# Patient Record
Sex: FEMALE | Race: WHITE | Employment: FULL TIME | URBAN - METROPOLITAN AREA
[De-identification: names, ages, dates, MRNs, and addresses within clinical notes are randomized per-mention and may not be internally consistent; named-entity substitution may affect disease eponyms.]

---

## 2023-07-11 ENCOUNTER — HOSPITAL ENCOUNTER (INPATIENT)
Age: 58
LOS: 1 days | Discharge: HOME | DRG: 384 | End: 2023-07-12
Attending: EMERGENCY MEDICINE | Admitting: INTERNAL MEDICINE
Payer: COMMERCIAL

## 2023-07-11 ENCOUNTER — APPOINTMENT (OUTPATIENT)
Dept: CT IMAGING | Age: 58
DRG: 384 | End: 2023-07-11
Payer: COMMERCIAL

## 2023-07-11 DIAGNOSIS — R10.9 ABDOMINAL PAIN, UNSPECIFIED ABDOMINAL LOCATION: ICD-10-CM

## 2023-07-11 DIAGNOSIS — K27.9 PUD (PEPTIC ULCER DISEASE): Primary | ICD-10-CM

## 2023-07-11 DIAGNOSIS — K29.90 GASTRITIS AND DUODENITIS: ICD-10-CM

## 2023-07-11 PROBLEM — D72.829 LEUKOCYTOSIS: Status: ACTIVE | Noted: 2023-07-11

## 2023-07-11 PROBLEM — E80.6 HYPERBILIRUBINEMIA: Status: ACTIVE | Noted: 2023-07-11

## 2023-07-11 PROBLEM — D72.819 LEUKOCYTOPENIA: Status: ACTIVE | Noted: 2023-07-11

## 2023-07-11 PROBLEM — E53.8 B12 DEFICIENCY: Status: ACTIVE | Noted: 2023-07-11

## 2023-07-11 PROBLEM — K29.80 DUODENITIS: Status: ACTIVE | Noted: 2023-07-11

## 2023-07-11 PROBLEM — R03.0 ELEVATED BLOOD PRESSURE READING: Status: ACTIVE | Noted: 2023-07-11

## 2023-07-11 LAB
ALBUMIN SERPL-MCNC: 4.5 G/DL (ref 3.4–5)
ALP SERPL-CCNC: 113 U/L (ref 40–129)
ALT SERPL-CCNC: 12 U/L (ref 10–40)
AMYLASE SERPL-CCNC: 31 U/L (ref 25–115)
ANION GAP SERPL CALCULATED.3IONS-SCNC: 14 MMOL/L (ref 3–16)
AST SERPL-CCNC: 21 U/L (ref 15–37)
BACTERIA URNS QL MICRO: ABNORMAL /HPF
BACTERIA URNS QL MICRO: NORMAL /HPF
BASOPHILS # BLD: 0.1 K/UL (ref 0–0.2)
BASOPHILS NFR BLD: 0.7 %
BILIRUB DIRECT SERPL-MCNC: <0.2 MG/DL (ref 0–0.3)
BILIRUB INDIRECT SERPL-MCNC: ABNORMAL MG/DL (ref 0–1)
BILIRUB SERPL-MCNC: 1.1 MG/DL (ref 0–1)
BILIRUB UR QL STRIP.AUTO: NEGATIVE
BILIRUB UR QL STRIP.AUTO: NEGATIVE
BUN SERPL-MCNC: 24 MG/DL (ref 7–20)
CALCIUM SERPL-MCNC: 9.5 MG/DL (ref 8.3–10.6)
CHLORIDE SERPL-SCNC: 101 MMOL/L (ref 99–110)
CLARITY UR: CLEAR
CLARITY UR: CLEAR
CO2 SERPL-SCNC: 22 MMOL/L (ref 21–32)
COLOR UR: YELLOW
COLOR UR: YELLOW
CREAT SERPL-MCNC: 0.5 MG/DL (ref 0.6–1.1)
DEPRECATED RDW RBC AUTO: 13.3 % (ref 12.4–15.4)
EKG ATRIAL RATE: 82 BPM
EKG DIAGNOSIS: NORMAL
EKG P AXIS: 65 DEGREES
EKG P-R INTERVAL: 134 MS
EKG Q-T INTERVAL: 386 MS
EKG QRS DURATION: 88 MS
EKG QTC CALCULATION (BAZETT): 450 MS
EKG R AXIS: 71 DEGREES
EKG T AXIS: 57 DEGREES
EKG VENTRICULAR RATE: 82 BPM
EOSINOPHIL # BLD: 0 K/UL (ref 0–0.6)
EOSINOPHIL NFR BLD: 0.1 %
EPI CELLS #/AREA URNS AUTO: 1 /HPF (ref 0–5)
EPI CELLS #/AREA URNS AUTO: 3 /HPF (ref 0–5)
GFR SERPLBLD CREATININE-BSD FMLA CKD-EPI: >60 ML/MIN/{1.73_M2}
GLUCOSE SERPL-MCNC: 111 MG/DL (ref 70–99)
GLUCOSE UR STRIP.AUTO-MCNC: NEGATIVE MG/DL
GLUCOSE UR STRIP.AUTO-MCNC: NEGATIVE MG/DL
HCT VFR BLD AUTO: 39.4 % (ref 36–48)
HCT VFR BLD AUTO: 44 % (ref 36–48)
HGB BLD-MCNC: 12.9 G/DL (ref 12–16)
HGB BLD-MCNC: 14.6 G/DL (ref 12–16)
HGB UR QL STRIP.AUTO: ABNORMAL
HGB UR QL STRIP.AUTO: ABNORMAL
HYALINE CASTS #/AREA URNS AUTO: 0 /LPF (ref 0–8)
HYALINE CASTS #/AREA URNS AUTO: 1 /LPF (ref 0–8)
KETONES UR STRIP.AUTO-MCNC: 40 MG/DL
KETONES UR STRIP.AUTO-MCNC: 40 MG/DL
LACTATE BLDV-SCNC: 0.9 MMOL/L (ref 0.4–1.9)
LACTATE BLDV-SCNC: 1.1 MMOL/L (ref 0.4–1.9)
LEUKOCYTE ESTERASE UR QL STRIP.AUTO: NEGATIVE
LEUKOCYTE ESTERASE UR QL STRIP.AUTO: NEGATIVE
LIPASE SERPL-CCNC: 28 U/L (ref 13–60)
LYMPHOCYTES # BLD: 2.2 K/UL (ref 1–5.1)
LYMPHOCYTES NFR BLD: 15.9 %
MAGNESIUM SERPL-MCNC: 2.1 MG/DL (ref 1.8–2.4)
MCH RBC QN AUTO: 29.8 PG (ref 26–34)
MCHC RBC AUTO-ENTMCNC: 33.2 G/DL (ref 31–36)
MCV RBC AUTO: 89.7 FL (ref 80–100)
MONOCYTES # BLD: 0.8 K/UL (ref 0–1.3)
MONOCYTES NFR BLD: 5.9 %
NEUTROPHILS # BLD: 10.6 K/UL (ref 1.7–7.7)
NEUTROPHILS NFR BLD: 77.4 %
NITRITE UR QL STRIP.AUTO: NEGATIVE
NITRITE UR QL STRIP.AUTO: NEGATIVE
PH UR STRIP.AUTO: 5 [PH] (ref 5–8)
PH UR STRIP.AUTO: 5.5 [PH] (ref 5–8)
PLATELET # BLD AUTO: 314 K/UL (ref 135–450)
PMV BLD AUTO: 9.6 FL (ref 5–10.5)
POTASSIUM SERPL-SCNC: 4.6 MMOL/L (ref 3.5–5.1)
PROT SERPL-MCNC: 7.8 G/DL (ref 6.4–8.2)
PROT UR STRIP.AUTO-MCNC: ABNORMAL MG/DL
PROT UR STRIP.AUTO-MCNC: ABNORMAL MG/DL
RBC # BLD AUTO: 4.91 M/UL (ref 4–5.2)
RBC CLUMPS #/AREA URNS AUTO: 1 /HPF (ref 0–4)
RBC CLUMPS #/AREA URNS AUTO: 6 /HPF (ref 0–4)
SODIUM SERPL-SCNC: 137 MMOL/L (ref 136–145)
SP GR UR STRIP.AUTO: >=1.03 (ref 1–1.03)
SP GR UR STRIP.AUTO: >=1.03 (ref 1–1.03)
UA COMPLETE W REFLEX CULTURE PNL UR: ABNORMAL
UA DIPSTICK W REFLEX MICRO PNL UR: YES
UA DIPSTICK W REFLEX MICRO PNL UR: YES
URN SPEC COLLECT METH UR: ABNORMAL
URN SPEC COLLECT METH UR: ABNORMAL
UROBILINOGEN UR STRIP-ACNC: 0.2 E.U./DL
UROBILINOGEN UR STRIP-ACNC: 0.2 E.U./DL
WBC # BLD AUTO: 13.7 K/UL (ref 4–11)
WBC #/AREA URNS AUTO: 0 /HPF (ref 0–5)
WBC #/AREA URNS AUTO: 1 /HPF (ref 0–5)

## 2023-07-11 PROCEDURE — 83735 ASSAY OF MAGNESIUM: CPT

## 2023-07-11 PROCEDURE — 96376 TX/PRO/DX INJ SAME DRUG ADON: CPT

## 2023-07-11 PROCEDURE — 36415 COLL VENOUS BLD VENIPUNCTURE: CPT

## 2023-07-11 PROCEDURE — 83690 ASSAY OF LIPASE: CPT

## 2023-07-11 PROCEDURE — 93010 ELECTROCARDIOGRAM REPORT: CPT | Performed by: INTERNAL MEDICINE

## 2023-07-11 PROCEDURE — 85018 HEMOGLOBIN: CPT

## 2023-07-11 PROCEDURE — 74177 CT ABD & PELVIS W/CONTRAST: CPT

## 2023-07-11 PROCEDURE — 80048 BASIC METABOLIC PNL TOTAL CA: CPT

## 2023-07-11 PROCEDURE — 83605 ASSAY OF LACTIC ACID: CPT

## 2023-07-11 PROCEDURE — 80076 HEPATIC FUNCTION PANEL: CPT

## 2023-07-11 PROCEDURE — 96375 TX/PRO/DX INJ NEW DRUG ADDON: CPT

## 2023-07-11 PROCEDURE — 96374 THER/PROPH/DIAG INJ IV PUSH: CPT

## 2023-07-11 PROCEDURE — 81001 URINALYSIS AUTO W/SCOPE: CPT

## 2023-07-11 PROCEDURE — 99285 EMERGENCY DEPT VISIT HI MDM: CPT

## 2023-07-11 PROCEDURE — 1200000000 HC SEMI PRIVATE

## 2023-07-11 PROCEDURE — 6360000002 HC RX W HCPCS: Performed by: PHYSICIAN ASSISTANT

## 2023-07-11 PROCEDURE — 2580000003 HC RX 258: Performed by: PHYSICIAN ASSISTANT

## 2023-07-11 PROCEDURE — 93005 ELECTROCARDIOGRAM TRACING: CPT | Performed by: PHYSICIAN ASSISTANT

## 2023-07-11 PROCEDURE — G0378 HOSPITAL OBSERVATION PER HR: HCPCS

## 2023-07-11 PROCEDURE — 82150 ASSAY OF AMYLASE: CPT

## 2023-07-11 PROCEDURE — 96366 THER/PROPH/DIAG IV INF ADDON: CPT

## 2023-07-11 PROCEDURE — 85025 COMPLETE CBC W/AUTO DIFF WBC: CPT

## 2023-07-11 PROCEDURE — 6360000004 HC RX CONTRAST MEDICATION: Performed by: PHYSICIAN ASSISTANT

## 2023-07-11 PROCEDURE — 96365 THER/PROPH/DIAG IV INF INIT: CPT

## 2023-07-11 PROCEDURE — 2580000003 HC RX 258: Performed by: INTERNAL MEDICINE

## 2023-07-11 PROCEDURE — 6360000002 HC RX W HCPCS: Performed by: INTERNAL MEDICINE

## 2023-07-11 PROCEDURE — C9113 INJ PANTOPRAZOLE SODIUM, VIA: HCPCS | Performed by: PHYSICIAN ASSISTANT

## 2023-07-11 PROCEDURE — 85014 HEMATOCRIT: CPT

## 2023-07-11 RX ORDER — METRONIDAZOLE 500 MG/100ML
500 INJECTION, SOLUTION INTRAVENOUS EVERY 8 HOURS
Status: DISCONTINUED | OUTPATIENT
Start: 2023-07-11 | End: 2023-07-12 | Stop reason: HOSPADM

## 2023-07-11 RX ORDER — ACETAMINOPHEN 650 MG/1
650 SUPPOSITORY RECTAL EVERY 6 HOURS PRN
Status: DISCONTINUED | OUTPATIENT
Start: 2023-07-11 | End: 2023-07-12 | Stop reason: HOSPADM

## 2023-07-11 RX ORDER — LANOLIN ALCOHOL/MO/W.PET/CERES
1000 CREAM (GRAM) TOPICAL DAILY
COMMUNITY

## 2023-07-11 RX ORDER — LANOLIN ALCOHOL/MO/W.PET/CERES
1000 CREAM (GRAM) TOPICAL DAILY
Status: DISCONTINUED | OUTPATIENT
Start: 2023-07-12 | End: 2023-07-12 | Stop reason: HOSPADM

## 2023-07-11 RX ORDER — SODIUM CHLORIDE 9 MG/ML
INJECTION, SOLUTION INTRAVENOUS PRN
Status: DISCONTINUED | OUTPATIENT
Start: 2023-07-11 | End: 2023-07-12 | Stop reason: HOSPADM

## 2023-07-11 RX ORDER — ESTRADIOL 0.07 MG/D
1 FILM, EXTENDED RELEASE TRANSDERMAL
Status: DISCONTINUED | OUTPATIENT
Start: 2023-07-14 | End: 2023-07-12 | Stop reason: HOSPADM

## 2023-07-11 RX ORDER — SODIUM CHLORIDE 0.9 % (FLUSH) 0.9 %
5-40 SYRINGE (ML) INJECTION EVERY 12 HOURS SCHEDULED
Status: DISCONTINUED | OUTPATIENT
Start: 2023-07-11 | End: 2023-07-12 | Stop reason: HOSPADM

## 2023-07-11 RX ORDER — 0.9 % SODIUM CHLORIDE 0.9 %
500 INTRAVENOUS SOLUTION INTRAVENOUS PRN
Status: DISCONTINUED | OUTPATIENT
Start: 2023-07-11 | End: 2023-07-12 | Stop reason: HOSPADM

## 2023-07-11 RX ORDER — HYDRALAZINE HYDROCHLORIDE 20 MG/ML
10 INJECTION INTRAMUSCULAR; INTRAVENOUS EVERY 6 HOURS PRN
Status: DISCONTINUED | OUTPATIENT
Start: 2023-07-11 | End: 2023-07-12 | Stop reason: HOSPADM

## 2023-07-11 RX ORDER — POTASSIUM CHLORIDE 20 MEQ/1
40 TABLET, EXTENDED RELEASE ORAL PRN
Status: DISCONTINUED | OUTPATIENT
Start: 2023-07-11 | End: 2023-07-12 | Stop reason: HOSPADM

## 2023-07-11 RX ORDER — POTASSIUM CHLORIDE 7.45 MG/ML
10 INJECTION INTRAVENOUS PRN
Status: DISCONTINUED | OUTPATIENT
Start: 2023-07-11 | End: 2023-07-12 | Stop reason: HOSPADM

## 2023-07-11 RX ORDER — 0.9 % SODIUM CHLORIDE 0.9 %
1000 INTRAVENOUS SOLUTION INTRAVENOUS ONCE
Status: COMPLETED | OUTPATIENT
Start: 2023-07-11 | End: 2023-07-11

## 2023-07-11 RX ORDER — LEVOFLOXACIN 5 MG/ML
750 INJECTION, SOLUTION INTRAVENOUS EVERY 24 HOURS
Status: DISCONTINUED | OUTPATIENT
Start: 2023-07-11 | End: 2023-07-12 | Stop reason: HOSPADM

## 2023-07-11 RX ORDER — SODIUM CHLORIDE 0.9 % (FLUSH) 0.9 %
5-40 SYRINGE (ML) INJECTION PRN
Status: DISCONTINUED | OUTPATIENT
Start: 2023-07-11 | End: 2023-07-12 | Stop reason: HOSPADM

## 2023-07-11 RX ORDER — CIPROFLOXACIN 2 MG/ML
400 INJECTION, SOLUTION INTRAVENOUS EVERY 12 HOURS
Status: DISCONTINUED | OUTPATIENT
Start: 2023-07-11 | End: 2023-07-11 | Stop reason: RX

## 2023-07-11 RX ORDER — HYDROMORPHONE HYDROCHLORIDE 1 MG/ML
1 INJECTION, SOLUTION INTRAMUSCULAR; INTRAVENOUS; SUBCUTANEOUS EVERY 6 HOURS PRN
Status: DISCONTINUED | OUTPATIENT
Start: 2023-07-11 | End: 2023-07-12 | Stop reason: HOSPADM

## 2023-07-11 RX ORDER — ONDANSETRON 2 MG/ML
4 INJECTION INTRAMUSCULAR; INTRAVENOUS EVERY 30 MIN PRN
Status: DISCONTINUED | OUTPATIENT
Start: 2023-07-11 | End: 2023-07-11

## 2023-07-11 RX ORDER — ONDANSETRON 2 MG/ML
4 INJECTION INTRAMUSCULAR; INTRAVENOUS EVERY 6 HOURS PRN
Status: DISCONTINUED | OUTPATIENT
Start: 2023-07-11 | End: 2023-07-12 | Stop reason: HOSPADM

## 2023-07-11 RX ORDER — ONDANSETRON 4 MG/1
4 TABLET, ORALLY DISINTEGRATING ORAL EVERY 8 HOURS PRN
Status: DISCONTINUED | OUTPATIENT
Start: 2023-07-11 | End: 2023-07-12 | Stop reason: HOSPADM

## 2023-07-11 RX ORDER — HYDROMORPHONE HYDROCHLORIDE 1 MG/ML
1 INJECTION, SOLUTION INTRAMUSCULAR; INTRAVENOUS; SUBCUTANEOUS ONCE
Status: COMPLETED | OUTPATIENT
Start: 2023-07-11 | End: 2023-07-11

## 2023-07-11 RX ORDER — ACETAMINOPHEN 325 MG/1
650 TABLET ORAL EVERY 6 HOURS PRN
Status: DISCONTINUED | OUTPATIENT
Start: 2023-07-11 | End: 2023-07-12 | Stop reason: HOSPADM

## 2023-07-11 RX ORDER — METHYLCELLULOSE 500 MG/1
1 TABLET ORAL DAILY
COMMUNITY

## 2023-07-11 RX ORDER — SODIUM CHLORIDE 9 MG/ML
INJECTION, SOLUTION INTRAVENOUS CONTINUOUS
Status: DISCONTINUED | OUTPATIENT
Start: 2023-07-11 | End: 2023-07-12 | Stop reason: HOSPADM

## 2023-07-11 RX ADMIN — HYDROMORPHONE HYDROCHLORIDE 1 MG: 1 INJECTION, SOLUTION INTRAMUSCULAR; INTRAVENOUS; SUBCUTANEOUS at 17:30

## 2023-07-11 RX ADMIN — SODIUM CHLORIDE 1000 ML: 9 INJECTION, SOLUTION INTRAVENOUS at 12:39

## 2023-07-11 RX ADMIN — SODIUM CHLORIDE 80 MG: 9 INJECTION, SOLUTION INTRAVENOUS at 15:39

## 2023-07-11 RX ADMIN — SODIUM CHLORIDE: 9 INJECTION, SOLUTION INTRAVENOUS at 17:42

## 2023-07-11 RX ADMIN — HYDROMORPHONE HYDROCHLORIDE 1 MG: 1 INJECTION, SOLUTION INTRAMUSCULAR; INTRAVENOUS; SUBCUTANEOUS at 12:40

## 2023-07-11 RX ADMIN — SODIUM CHLORIDE 8 MG/HR: 9 INJECTION, SOLUTION INTRAVENOUS at 16:20

## 2023-07-11 RX ADMIN — METRONIDAZOLE 500 MG: 500 INJECTION, SOLUTION INTRAVENOUS at 17:43

## 2023-07-11 RX ADMIN — IOPAMIDOL 75 ML: 755 INJECTION, SOLUTION INTRAVENOUS at 14:12

## 2023-07-11 RX ADMIN — ONDANSETRON 4 MG: 2 INJECTION INTRAMUSCULAR; INTRAVENOUS at 12:40

## 2023-07-11 RX ADMIN — LEVOFLOXACIN 750 MG: 5 INJECTION, SOLUTION INTRAVENOUS at 19:28

## 2023-07-11 ASSESSMENT — PAIN - FUNCTIONAL ASSESSMENT: PAIN_FUNCTIONAL_ASSESSMENT: 0-10

## 2023-07-11 ASSESSMENT — PAIN DESCRIPTION - ORIENTATION
ORIENTATION: RIGHT;UPPER
ORIENTATION: UPPER
ORIENTATION: RIGHT;UPPER
ORIENTATION: RIGHT

## 2023-07-11 ASSESSMENT — PAIN SCALES - GENERAL
PAINLEVEL_OUTOF10: 5
PAINLEVEL_OUTOF10: 7
PAINLEVEL_OUTOF10: 8
PAINLEVEL_OUTOF10: 8
PAINLEVEL_OUTOF10: 0

## 2023-07-11 ASSESSMENT — ENCOUNTER SYMPTOMS
SHORTNESS OF BREATH: 0
COUGH: 0
DIARRHEA: 1
NAUSEA: 1
ABDOMINAL PAIN: 1
CHEST TIGHTNESS: 0
VOMITING: 0

## 2023-07-11 ASSESSMENT — PAIN DESCRIPTION - LOCATION
LOCATION: ABDOMEN

## 2023-07-11 ASSESSMENT — LIFESTYLE VARIABLES: HOW OFTEN DO YOU HAVE A DRINK CONTAINING ALCOHOL: NEVER

## 2023-07-11 ASSESSMENT — PAIN DESCRIPTION - DESCRIPTORS
DESCRIPTORS: ACHING
DESCRIPTORS: SHARP;STABBING

## 2023-07-11 NOTE — PROGRESS NOTES
Patient admitted to the unit from ED. She is A&Ox4, VSS and on room air. Call light in reach, will continue to monitor.

## 2023-07-11 NOTE — PROGRESS NOTES
Clinical Pharmacy Note    Pharmacy can not supply estradiol patch. I have notified the nurse and requested the medication be brought from home. The patient/nurse is unsure if the medication will be able to be brought in. Please follow to ensure that medication therapy needs are met.     Thanks,  Roberto Snyder, PharmD

## 2023-07-11 NOTE — ED PROVIDER NOTES
HENT:      Head: Normocephalic and atraumatic. Mouth/Throat:      Mouth: Mucous membranes are moist.   Eyes:      General:         Right eye: No discharge. Left eye: No discharge. Pupils: Pupils are equal, round, and reactive to light. Cardiovascular:      Rate and Rhythm: Normal rate and regular rhythm. Pulses: Normal pulses. Heart sounds: Normal heart sounds. Pulmonary:      Effort: Pulmonary effort is normal. No respiratory distress. Breath sounds: Normal breath sounds. Abdominal:      General: Abdomen is flat. Bowel sounds are normal. There is no distension. Palpations: Abdomen is soft. Tenderness: There is abdominal tenderness in the right upper quadrant. There is no guarding. Positive signs include Aragon's sign. Comments: Patient has moderate tenderness on palpation of the right upper abdomen with a positive Aragon sign. Her abdomen is soft with bowel sounds present in all 4 quadrants. There is no distention, rigidity or guarding. Musculoskeletal:         General: Normal range of motion. Cervical back: Normal range of motion and neck supple. Skin:     General: Skin is warm and dry. Capillary Refill: Capillary refill takes less than 2 seconds. Coloration: Skin is not pale. Neurological:      Mental Status: She is alert and oriented to person, place, and time. Psychiatric:         Behavior: Behavior normal.           DIAGNOSTIC RESULTS   LABS:    Labs Reviewed   CBC WITH AUTO DIFFERENTIAL - Abnormal; Notable for the following components:       Result Value    WBC 13.7 (*)     Neutrophils Absolute 10.6 (*)     All other components within normal limits   BASIC METABOLIC PANEL - Abnormal; Notable for the following components:    Glucose 111 (*)     BUN 24 (*)     Creatinine 0.5 (*)     All other components within normal limits   HEPATIC FUNCTION PANEL - Abnormal; Notable for the following components:     Total Bilirubin 1.1 (*)     All mucosal enhancement with mild adjacent inflammatory changes involving the duodenal bulb and proximal aspect of the second portion of the duodenum concerning for peptic ulcer disease. There is outpouching medially at the proximal second portion of the duodenum which could represent a small ulceration. Patient was very uncomfortable at the time of my initial examination. She is given fluids as well as Dilaudid and Zofran. I did consult gastroenterology who recommends admission with EGD in the morning. Patient is started on a Protonix bolus and infusion. Disposition Considerations (include 1 Tests not done, Shared Decision Making, Pt Expectation of Test or Tx.): I had a lengthy discussion with the patient regarding testing completed in the emergency department today as well as the results. She understands she will require admission for further evaluation and treatment. She is agreeable with this. She is now resting comfortably and in no obvious distress. She states she is feeling much better. She continues denying any chest pain or shortness of breath. I believe her right shoulder pain could be a [employer]. Dr. Dionte Moulton was consulted who is agreeable with admission. Admission      I am the Primary Clinician of Record. FINAL IMPRESSION      1. PUD (peptic ulcer disease)    2. Gastritis and duodenitis          DISPOSITION/PLAN     DISPOSITION Decision To Admit 07/11/2023 02:55:32 PM      PATIENT REFERRED TO:  No follow-up provider specified.     DISCHARGE MEDICATIONS:  New Prescriptions    No medications on file       DISCONTINUED MEDICATIONS:  Discontinued Medications    No medications on file              (Please note that portions of this note were completed with a voice recognition program.  Efforts were made to edit the dictations but occasionally words are mis-transcribed.)    Shama Cintron PA-C (electronically signed)           Shama Cintron PA-C  07/11/23 3761

## 2023-07-11 NOTE — PROGRESS NOTES
Pt admitted for peptic ulcer disease    Full h+p to follow    Active Hospital Problems    Diagnosis Date Noted    Peptic ulcer disease [K27.9] 07/11/2023    Duodenitis [K29.80] 07/11/2023    B12 deficiency [E53.8] 07/11/2023    Hyperbilirubinemia [E80.6] 07/11/2023    Elevated blood pressure reading [R03.0] 07/11/2023    Leukocytopenia [D72.819] 07/11/2023    Leukocytosis [D72.829] 07/11/2023       Please use Synference to contact me with any questions during the day. The hospitalist service will provide cross-coverage for this patient from 7pm to 7am.    During those hours, contact the on-call hospitalist MD/AYDE for questions.

## 2023-07-11 NOTE — PROGRESS NOTES
Pharmacy Home Medication Reconciliation Note    A medication reconciliation has been completed for Sarah Barnett 1965    Pharmacy: [id number] [address]  Information provided by: patient    The patient's home medication list is as follows: No current facility-administered medications on file prior to encounter. Current Outpatient Medications on File Prior to Encounter   Medication Sig Dispense Refill    vitamin B-12 (CYANOCOBALAMIN) 1000 MCG tablet Take 1 tablet by mouth daily      methylcellulose (CITRUCEL) 500 MG TABS Take 1 tablet by mouth daily      estradiol (VIVELLE) 0.075 MG/24HR APPLY 1 PATCH TRANSDERMALLY TWICE WEEKLY 3 patch 2       Of note, patient placed estradiol patch this morning: replaces twice weekly (usually Monday and Thursday). Timing of last doses updated. Thank you,  Elayne Erazo

## 2023-07-11 NOTE — PROGRESS NOTES
Clinical Pharmacy Note    Pharmacy can not supply methylcellulose. I have notified the nurse and requested the medication be brought from home. The patient/nurse is unsure if the medication will be able to be brought in. Please follow to ensure that medication therapy needs are met.     Thanks,  Albaro Lema, PharmD

## 2023-07-11 NOTE — ED NOTES
ED TO INPATIENT SBAR HANDOFF    Patient Name: Rae Adams   :  1965  59 y.o. MRN:  [id number]  Preferred Name  [address]  ED Room #:  ED-0028/28  Family/Caregiver Present yes   Restraints no   Sitter no   Sepsis Risk Score Sepsis Risk Score: 1.15    Situation  Code Status: No Order No additional code details. Allergies: Codeine, Hydrocodone-acetaminophen, Oxycodone-acetaminophen, and Propoxyphene  Weight: Patient Vitals for the past 96 hrs (Last 3 readings):   Weight   23 1147 226 lb 8 oz (102.7 kg)     Arrived from: home  Chief Complaint:   Chief Complaint   Patient presents with    Abdominal Pain     Lower right abdominal, upper right shoulder pain, nausea, fever on Saturday 100.7, chills     Hospital Problem/Diagnosis:  Principal Problem:    Peptic ulcer disease  Active Problems:    Duodenitis    B12 deficiency    Hyperbilirubinemia    Elevated blood pressure reading    Leukocytosis  Resolved Problems:    * No resolved hospital problems. *    Imaging:   CT ABDOMEN PELVIS W IV CONTRAST Additional Contrast? None   Final Result   1. Thickening, increased mucosal enhancement mild adjacent inflammatory   changes involving the duodenal bulb and proximal aspect of the 2nd portion of   the duodenum which could represent duodenitis or peptic ulcer disease. Peptic ulcer disease is favored. There is a small outpouching medially at   the proximal 2nd portion the duodenum which could represent a small   ulceration. 2. No acute findings elsewhere in the abdomen or pelvis.            Abnormal labs:   Abnormal Labs Reviewed   CBC WITH AUTO DIFFERENTIAL - Abnormal; Notable for the following components:       Result Value    WBC 13.7 (*)     Neutrophils Absolute 10.6 (*)     All other components within normal limits   BASIC METABOLIC PANEL - Abnormal; Notable for the following components:    Glucose 111 (*)     BUN 24 (*)     Creatinine 0.5 (*)     All other components within normal limits   HEPATIC FUNCTION PANEL - Abnormal; Notable for the following components: Total Bilirubin 1.1 (*)     All other components within normal limits   URINALYSIS WITH REFLEX TO CULTURE - Abnormal; Notable for the following components:    Ketones, Urine 40 (*)     Blood, Urine SMALL (*)     Protein, UA TRACE (*)     All other components within normal limits   MICROSCOPIC URINALYSIS - Abnormal; Notable for the following components:    RBC, UA 6 (*)     All other components within normal limits     Critical values: no     Abnormal Assessment Findings:     Background  History:   Past Medical History:   Diagnosis Date    Herniated cervical disc        Assessment    Vitals/MEWS: MEWS Score: 1  Level of Consciousness: Alert (0)   Vitals:    07/11/23 1225 07/11/23 1230 07/11/23 1235 07/11/23 1240   BP:       Pulse: 93 81 84 83   Resp: 12 18 13 15   Temp:       TempSrc:       SpO2: 96% 98% 97% 98%   Weight:       Height:         FiO2 (%): None  O2 Flow Rate: O2 Device: None (Room air)    Cardiac Rhythm:    Pain Assessment: 7/10 [] Verbal [] Synetta Carrier Scale  Pain Scale: Pain Assessment  Pain Assessment: 0-10  Pain Level: 8  Patient's Stated Pain Goal: 0 - No pain  Pain Location: Abdomen  Pain Orientation: Upper  Last documented pain score (0-10 scale) Pain Level: 8  Last documented pain medication administered: 1400  Mental Status: oriented  Orientation Level:    NIH Score:    C-SSRS: Risk of Suicide: No Risk  Bedside swallow:    [place] Coma Scale (GCS): Naila Coma Scale  Eye Opening: Spontaneous  Best Verbal Response: Oriented  Best Motor Response: Obeys commands  [place] Coma Scale Score: 15  Active LDA's:   Peripheral IV 07/11/23 Left Antecubital (Active)   Site Assessment Clean, dry & intact 07/11/23 1234   Line Status Blood return noted; Flushed 07/11/23 1234   Phlebitis Assessment No symptoms 07/11/23 1234   Infiltration Assessment 0 07/11/23 1234     PO Status: Nothing by Mouth at midnight  Pertinent or High Risk

## 2023-07-12 ENCOUNTER — ANESTHESIA EVENT (OUTPATIENT)
Dept: ENDOSCOPY | Age: 58
DRG: 384 | End: 2023-07-12
Payer: COMMERCIAL

## 2023-07-12 ENCOUNTER — ANESTHESIA (OUTPATIENT)
Dept: ENDOSCOPY | Age: 58
DRG: 384 | End: 2023-07-12
Payer: COMMERCIAL

## 2023-07-12 VITALS
SYSTOLIC BLOOD PRESSURE: 158 MMHG | HEIGHT: 67 IN | TEMPERATURE: 98 F | RESPIRATION RATE: 20 BRPM | WEIGHT: 226 LBS | DIASTOLIC BLOOD PRESSURE: 82 MMHG | BODY MASS INDEX: 35.47 KG/M2 | HEART RATE: 70 BPM | OXYGEN SATURATION: 98 %

## 2023-07-12 LAB
ANION GAP SERPL CALCULATED.3IONS-SCNC: 10 MMOL/L (ref 3–16)
APTT BLD: 27.4 SEC (ref 22.7–35.9)
BASOPHILS # BLD: 0.1 K/UL (ref 0–0.2)
BASOPHILS NFR BLD: 0.6 %
BUN SERPL-MCNC: 13 MG/DL (ref 7–20)
CALCIUM SERPL-MCNC: 8.9 MG/DL (ref 8.3–10.6)
CHLORIDE SERPL-SCNC: 105 MMOL/L (ref 99–110)
CO2 SERPL-SCNC: 23 MMOL/L (ref 21–32)
CREAT SERPL-MCNC: 0.5 MG/DL (ref 0.6–1.1)
DEPRECATED RDW RBC AUTO: 13.2 % (ref 12.4–15.4)
EOSINOPHIL # BLD: 0 K/UL (ref 0–0.6)
EOSINOPHIL NFR BLD: 0.5 %
GFR SERPLBLD CREATININE-BSD FMLA CKD-EPI: >60 ML/MIN/{1.73_M2}
GLUCOSE SERPL-MCNC: 103 MG/DL (ref 70–99)
HCT VFR BLD AUTO: 38.4 % (ref 36–48)
HCT VFR BLD AUTO: 41.2 % (ref 36–48)
HCT VFR BLD AUTO: 41.4 % (ref 36–48)
HGB BLD-MCNC: 12.9 G/DL (ref 12–16)
HGB BLD-MCNC: 13.6 G/DL (ref 12–16)
HGB BLD-MCNC: 13.7 G/DL (ref 12–16)
INR PPP: 1.16 (ref 0.84–1.16)
IRON SATN MFR SERPL: 43 % (ref 15–50)
IRON SERPL-MCNC: 143 UG/DL (ref 37–145)
LACTATE BLDV-SCNC: 1.1 MMOL/L (ref 0.4–2)
LYMPHOCYTES # BLD: 2 K/UL (ref 1–5.1)
LYMPHOCYTES NFR BLD: 22.5 %
MAGNESIUM SERPL-MCNC: 2 MG/DL (ref 1.8–2.4)
MCH RBC QN AUTO: 30 PG (ref 26–34)
MCHC RBC AUTO-ENTMCNC: 33.2 G/DL (ref 31–36)
MCV RBC AUTO: 90.2 FL (ref 80–100)
MONOCYTES # BLD: 0.7 K/UL (ref 0–1.3)
MONOCYTES NFR BLD: 7.7 %
NEUTROPHILS # BLD: 6.2 K/UL (ref 1.7–7.7)
NEUTROPHILS NFR BLD: 68.7 %
PLATELET # BLD AUTO: 262 K/UL (ref 135–450)
PMV BLD AUTO: 9.2 FL (ref 5–10.5)
POTASSIUM SERPL-SCNC: 3.9 MMOL/L (ref 3.5–5.1)
PROCALCITONIN SERPL IA-MCNC: 0.05 NG/ML (ref 0–0.15)
PROTHROMBIN TIME: 14.8 SEC (ref 11.5–14.8)
RBC # BLD AUTO: 4.57 M/UL (ref 4–5.2)
SODIUM SERPL-SCNC: 138 MMOL/L (ref 136–145)
TIBC SERPL-MCNC: 334 UG/DL (ref 260–445)
WBC # BLD AUTO: 9.1 K/UL (ref 4–11)

## 2023-07-12 PROCEDURE — 84145 PROCALCITONIN (PCT): CPT

## 2023-07-12 PROCEDURE — 2580000003 HC RX 258: Performed by: INTERNAL MEDICINE

## 2023-07-12 PROCEDURE — 2580000003 HC RX 258: Performed by: NURSE ANESTHETIST, CERTIFIED REGISTERED

## 2023-07-12 PROCEDURE — 96366 THER/PROPH/DIAG IV INF ADDON: CPT

## 2023-07-12 PROCEDURE — 3609012400 HC EGD TRANSORAL BIOPSY SINGLE/MULTIPLE: Performed by: INTERNAL MEDICINE

## 2023-07-12 PROCEDURE — 80048 BASIC METABOLIC PNL TOTAL CA: CPT

## 2023-07-12 PROCEDURE — 85014 HEMATOCRIT: CPT

## 2023-07-12 PROCEDURE — G0378 HOSPITAL OBSERVATION PER HR: HCPCS

## 2023-07-12 PROCEDURE — 88305 TISSUE EXAM BY PATHOLOGIST: CPT

## 2023-07-12 PROCEDURE — 7100000001 HC PACU RECOVERY - ADDTL 15 MIN: Performed by: INTERNAL MEDICINE

## 2023-07-12 PROCEDURE — 83540 ASSAY OF IRON: CPT

## 2023-07-12 PROCEDURE — 85610 PROTHROMBIN TIME: CPT

## 2023-07-12 PROCEDURE — 85025 COMPLETE CBC W/AUTO DIFF WBC: CPT

## 2023-07-12 PROCEDURE — 3700000001 HC ADD 15 MINUTES (ANESTHESIA): Performed by: INTERNAL MEDICINE

## 2023-07-12 PROCEDURE — 85730 THROMBOPLASTIN TIME PARTIAL: CPT

## 2023-07-12 PROCEDURE — 7100000000 HC PACU RECOVERY - FIRST 15 MIN: Performed by: INTERNAL MEDICINE

## 2023-07-12 PROCEDURE — 36415 COLL VENOUS BLD VENIPUNCTURE: CPT

## 2023-07-12 PROCEDURE — 6370000000 HC RX 637 (ALT 250 FOR IP): Performed by: INTERNAL MEDICINE

## 2023-07-12 PROCEDURE — 83735 ASSAY OF MAGNESIUM: CPT

## 2023-07-12 PROCEDURE — 88342 IMHCHEM/IMCYTCHM 1ST ANTB: CPT

## 2023-07-12 PROCEDURE — 83550 IRON BINDING TEST: CPT

## 2023-07-12 PROCEDURE — 2500000003 HC RX 250 WO HCPCS: Performed by: NURSE ANESTHETIST, CERTIFIED REGISTERED

## 2023-07-12 PROCEDURE — 83605 ASSAY OF LACTIC ACID: CPT

## 2023-07-12 PROCEDURE — 96376 TX/PRO/DX INJ SAME DRUG ADON: CPT

## 2023-07-12 PROCEDURE — 3700000000 HC ANESTHESIA ATTENDED CARE: Performed by: INTERNAL MEDICINE

## 2023-07-12 PROCEDURE — 6360000002 HC RX W HCPCS: Performed by: INTERNAL MEDICINE

## 2023-07-12 PROCEDURE — 6360000002 HC RX W HCPCS: Performed by: NURSE ANESTHETIST, CERTIFIED REGISTERED

## 2023-07-12 PROCEDURE — 2709999900 HC NON-CHARGEABLE SUPPLY: Performed by: INTERNAL MEDICINE

## 2023-07-12 PROCEDURE — 0DB68ZX EXCISION OF STOMACH, VIA NATURAL OR ARTIFICIAL OPENING ENDOSCOPIC, DIAGNOSTIC: ICD-10-PCS | Performed by: INTERNAL MEDICINE

## 2023-07-12 PROCEDURE — 85018 HEMOGLOBIN: CPT

## 2023-07-12 RX ORDER — PROPOFOL 10 MG/ML
INJECTION, EMULSION INTRAVENOUS PRN
Status: DISCONTINUED | OUTPATIENT
Start: 2023-07-12 | End: 2023-07-12 | Stop reason: SDUPTHER

## 2023-07-12 RX ORDER — PANTOPRAZOLE SODIUM 40 MG/1
40 TABLET, DELAYED RELEASE ORAL
Qty: 30 TABLET | Refills: 3 | Status: SHIPPED | OUTPATIENT
Start: 2023-07-12

## 2023-07-12 RX ORDER — LIDOCAINE HYDROCHLORIDE 20 MG/ML
INJECTION, SOLUTION EPIDURAL; INFILTRATION; INTRACAUDAL; PERINEURAL PRN
Status: DISCONTINUED | OUTPATIENT
Start: 2023-07-12 | End: 2023-07-12 | Stop reason: SDUPTHER

## 2023-07-12 RX ORDER — SODIUM CHLORIDE 9 MG/ML
INJECTION, SOLUTION INTRAVENOUS CONTINUOUS
Status: DISCONTINUED | OUTPATIENT
Start: 2023-07-12 | End: 2023-07-12 | Stop reason: HOSPADM

## 2023-07-12 RX ORDER — PANTOPRAZOLE SODIUM 40 MG/1
40 TABLET, DELAYED RELEASE ORAL
Status: DISCONTINUED | OUTPATIENT
Start: 2023-07-12 | End: 2023-07-12 | Stop reason: HOSPADM

## 2023-07-12 RX ORDER — SODIUM CHLORIDE 9 MG/ML
INJECTION, SOLUTION INTRAVENOUS CONTINUOUS PRN
Status: DISCONTINUED | OUTPATIENT
Start: 2023-07-12 | End: 2023-07-12 | Stop reason: SDUPTHER

## 2023-07-12 RX ADMIN — PROPOFOL 50 MG: 10 INJECTION, EMULSION INTRAVENOUS at 10:45

## 2023-07-12 RX ADMIN — SODIUM CHLORIDE: 9 INJECTION, SOLUTION INTRAVENOUS at 10:08

## 2023-07-12 RX ADMIN — HYDROMORPHONE HYDROCHLORIDE 1 MG: 1 INJECTION, SOLUTION INTRAMUSCULAR; INTRAVENOUS; SUBCUTANEOUS at 00:09

## 2023-07-12 RX ADMIN — ACETAMINOPHEN 650 MG: 325 TABLET ORAL at 05:31

## 2023-07-12 RX ADMIN — PROPOFOL 100 MCG/KG/MIN: 10 INJECTION, EMULSION INTRAVENOUS at 11:23

## 2023-07-12 RX ADMIN — SODIUM CHLORIDE: 9 INJECTION, SOLUTION INTRAVENOUS at 11:17

## 2023-07-12 RX ADMIN — METRONIDAZOLE 500 MG: 500 INJECTION, SOLUTION INTRAVENOUS at 00:15

## 2023-07-12 RX ADMIN — SODIUM CHLORIDE, PRESERVATIVE FREE 10 ML: 5 INJECTION INTRAVENOUS at 08:29

## 2023-07-12 RX ADMIN — PANTOPRAZOLE SODIUM 40 MG: 40 TABLET, DELAYED RELEASE ORAL at 15:28

## 2023-07-12 RX ADMIN — PROPOFOL 50 MG: 10 INJECTION, EMULSION INTRAVENOUS at 11:22

## 2023-07-12 RX ADMIN — LIDOCAINE HYDROCHLORIDE 100 MG: 20 INJECTION, SOLUTION EPIDURAL; INFILTRATION; INTRACAUDAL; PERINEURAL at 11:22

## 2023-07-12 RX ADMIN — METRONIDAZOLE 500 MG: 500 INJECTION, SOLUTION INTRAVENOUS at 08:32

## 2023-07-12 ASSESSMENT — PAIN SCALES - GENERAL
PAINLEVEL_OUTOF10: 3
PAINLEVEL_OUTOF10: 1
PAINLEVEL_OUTOF10: 7
PAINLEVEL_OUTOF10: 0

## 2023-07-12 ASSESSMENT — PAIN DESCRIPTION - DESCRIPTORS: DESCRIPTORS: ACHING

## 2023-07-12 ASSESSMENT — PAIN - FUNCTIONAL ASSESSMENT: PAIN_FUNCTIONAL_ASSESSMENT: NONE - DENIES PAIN

## 2023-07-12 ASSESSMENT — ENCOUNTER SYMPTOMS: SHORTNESS OF BREATH: 0

## 2023-07-12 ASSESSMENT — PAIN DESCRIPTION - LOCATION
LOCATION: ABDOMEN
LOCATION: HEAD

## 2023-07-12 ASSESSMENT — LIFESTYLE VARIABLES: SMOKING_STATUS: 0

## 2023-07-12 NOTE — CARE COORDINATION
Discharge Planning Note:    Chart reviewed and it appears that patient has minimal needs for discharge at this time. Discussed with patient and requested that case management be notified if discharge needs are identified.     - Current discharge plan is for the patient to return home. Case management will continue to follow progress and update discharge plan as needed.       Risk of Readmission Score: 4%    MARNIE BrantleyN RN    [facility]  Phone: [phone]

## 2023-07-12 NOTE — PROGRESS NOTES
Pt meets criteria to be transferred back to T. Pt transferred back via wheelchair with RN.   Report given to 4T RN at bedside no

## 2023-07-12 NOTE — ANESTHESIA POSTPROCEDURE EVALUATION
Department of Anesthesiology  Postprocedure Note    Patient: Dheeraj Lord  MRN: [id number]  YOB: 1965  Date of evaluation: 7/12/2023      Procedure Summary     Date: 07/12/23 Room / Location: [address] [place] [address]    Anesthesia Start: 1117 Anesthesia Stop: 8660    Procedure: EGD BIOPSY (Abdomen) Diagnosis:       Abdominal pain, unspecified abdominal location      (Abdominal pain, unspecified abdominal location [R10.9])    Surgeons: Isaias Reyes MD Responsible Provider: Vannesa Jones MD    Anesthesia Type: MAC ASA Status: 2          Anesthesia Type: No value filed. Shane Phase I: Shane Score: 10    Shane Phase II:        Anesthesia Post Evaluation    Patient location during evaluation: PACU  Level of consciousness: awake  Airway patency: patent  Complications: no  Cardiovascular status: hemodynamically stable  Respiratory status: acceptable  There was medical reason for not using a multimodal analgesia pain management approach.

## 2023-07-12 NOTE — PROGRESS NOTES
Nutrition Education    Educated on Soft, low fiber diet. Learners: Patient  Readiness: Acceptance  Method: Explanation and Handout  Response: Verbalizes Understanding  Contact name and number provided.     Antonino Hill RD, VIKA  Contact Number: [phone]

## 2023-07-12 NOTE — PROGRESS NOTES
Progress Note - Dr. Jackson Galvan - Internal Medicine  PCP: Ofelia Roblero, DO [address] / [address] Day: 1  Code Status: Full Code  Current Diet: Diet NPO Exceptions are: Ice Chips        CC: follow up on medical issues    Subjective:   Ryan Nolasco is a 59 y.o. female. Pt seen and examined  Chart reviewed since last visit, labs and imaging below      Doing ok  Pain controlled  No complaints  Abd pain improved  Awaiting EGD today  WBC normal, procal 0.05      Review of Systems: (1 system for EPF, 2-9 for detailed, 10+ for comprehensive)  Constitutional: Negative for chills and fever. HENT: Negative for dental problem, nosebleeds and rhinorrhea. Eyes: Negative for photophobia and visual disturbance. Respiratory: Negative for cough, chest tightness and shortness of breath. Cardiovascular: Negative for chest pain and leg swelling. Gastrointestinal: Negative for diarrhea, nausea and vomiting. Endocrine: Negative for polydipsia and polyphagia. Genitourinary: Negative for frequency, hematuria and urgency. Musculoskeletal: Negative for back pain and myalgias. Skin: Negative for rash. Allergic/Immunologic: Negative for food allergies. Neurological: Negative for dizziness, seizures, syncope and facial asymmetry. Hematological: Negative for adenopathy. Psychiatric/Behavioral: Negative for dysphoric mood. The patient is not nervous/anxious. I have reviewed the patient's medical and social history in detail and updated the computerized patient record. To recap: She  has a past medical history of Herniated cervical disc. Sulema Thomas She  has a past surgical history that includes Hysterectomy; Bilateral oophorectomy; Knee arthroscopy (Right); and Shoulder arthroscopy (Right). . She  reports that she quit smoking about 15 years ago. Her smoking use included cigarettes.  She has never used smokeless tobacco. She reports that

## 2023-07-12 NOTE — PROGRESS NOTES
2015: Assessment complete, VSS, BS hypoactive, pt states abd/back pain have improved, tolerating liquids, discussed care plan, pt mutually agrees, call light and belongings in reach, will continue monitoring. 0009: gave dilaudid for abd pain, see VANESSA Dos Santos RN

## 2023-07-12 NOTE — PROGRESS NOTES
Egd on chart, giant duodenal bulb ulcer but no active bleeding, + narrowing of junction bulb and second portion of duodenum but no obstruction. Rec  protonix 40 mg po bid  stop motrin/nsaids  call office 1 week for biopsy results   repeat egd 8-10 weeks ensure ulcer healed and ensure no stenosis of proximal duodenum from ulcer healing  clear liquids now, assuming tolerates then advance to very soft diet; stay on soft diet until repeat egd  Can dc antibiotics from gi/ulcer standpoint. otherwise will sign off, call if needed.

## 2023-09-13 ENCOUNTER — ANESTHESIA (OUTPATIENT)
Dept: ENDOSCOPY | Age: 58
End: 2023-09-13
Payer: COMMERCIAL

## 2023-09-13 ENCOUNTER — HOSPITAL ENCOUNTER (OUTPATIENT)
Age: 58
Setting detail: OUTPATIENT SURGERY
Discharge: HOME | End: 2023-09-13
Attending: INTERNAL MEDICINE | Admitting: INTERNAL MEDICINE
Payer: COMMERCIAL

## 2023-09-13 ENCOUNTER — ANESTHESIA EVENT (OUTPATIENT)
Dept: ENDOSCOPY | Age: 58
End: 2023-09-13
Payer: COMMERCIAL

## 2023-09-13 VITALS
HEART RATE: 66 BPM | BODY MASS INDEX: 35.22 KG/M2 | TEMPERATURE: 97.3 F | DIASTOLIC BLOOD PRESSURE: 89 MMHG | SYSTOLIC BLOOD PRESSURE: 163 MMHG | WEIGHT: 224.87 LBS | OXYGEN SATURATION: 98 % | RESPIRATION RATE: 18 BRPM

## 2023-09-13 PROCEDURE — 3700000001 HC ADD 15 MINUTES (ANESTHESIA): Performed by: INTERNAL MEDICINE

## 2023-09-13 PROCEDURE — 6360000002 HC RX W HCPCS

## 2023-09-13 PROCEDURE — 7100000011 HC PHASE II RECOVERY - ADDTL 15 MIN: Performed by: INTERNAL MEDICINE

## 2023-09-13 PROCEDURE — 7100000001 HC PACU RECOVERY - ADDTL 15 MIN: Performed by: INTERNAL MEDICINE

## 2023-09-13 PROCEDURE — 7100000010 HC PHASE II RECOVERY - FIRST 15 MIN: Performed by: INTERNAL MEDICINE

## 2023-09-13 PROCEDURE — 2500000003 HC RX 250 WO HCPCS

## 2023-09-13 PROCEDURE — 3700000000 HC ANESTHESIA ATTENDED CARE: Performed by: INTERNAL MEDICINE

## 2023-09-13 PROCEDURE — 3609017700 HC EGD DILATION GASTRIC/DUODENAL STRICTURE: Performed by: INTERNAL MEDICINE

## 2023-09-13 PROCEDURE — 2709999900 HC NON-CHARGEABLE SUPPLY: Performed by: INTERNAL MEDICINE

## 2023-09-13 PROCEDURE — 2580000003 HC RX 258

## 2023-09-13 PROCEDURE — 7100000000 HC PACU RECOVERY - FIRST 15 MIN: Performed by: INTERNAL MEDICINE

## 2023-09-13 PROCEDURE — C1726 CATH, BAL DIL, NON-VASCULAR: HCPCS | Performed by: INTERNAL MEDICINE

## 2023-09-13 RX ORDER — PROPOFOL 10 MG/ML
INJECTION, EMULSION INTRAVENOUS PRN
Status: DISCONTINUED | OUTPATIENT
Start: 2023-09-13 | End: 2023-09-13 | Stop reason: SDUPTHER

## 2023-09-13 RX ORDER — SODIUM CHLORIDE 9 MG/ML
INJECTION, SOLUTION INTRAVENOUS PRN
Status: DISCONTINUED | OUTPATIENT
Start: 2023-09-13 | End: 2023-09-13 | Stop reason: HOSPADM

## 2023-09-13 RX ORDER — SODIUM CHLORIDE 9 MG/ML
INJECTION, SOLUTION INTRAVENOUS CONTINUOUS PRN
Status: DISCONTINUED | OUTPATIENT
Start: 2023-09-13 | End: 2023-09-13 | Stop reason: SDUPTHER

## 2023-09-13 RX ORDER — SODIUM CHLORIDE 0.9 % (FLUSH) 0.9 %
5-40 SYRINGE (ML) INJECTION EVERY 12 HOURS SCHEDULED
Status: DISCONTINUED | OUTPATIENT
Start: 2023-09-13 | End: 2023-09-13 | Stop reason: HOSPADM

## 2023-09-13 RX ORDER — SODIUM CHLORIDE 0.9 % (FLUSH) 0.9 %
5-40 SYRINGE (ML) INJECTION PRN
Status: DISCONTINUED | OUTPATIENT
Start: 2023-09-13 | End: 2023-09-13 | Stop reason: HOSPADM

## 2023-09-13 RX ORDER — LIDOCAINE HYDROCHLORIDE 20 MG/ML
INJECTION, SOLUTION EPIDURAL; INFILTRATION; INTRACAUDAL; PERINEURAL PRN
Status: DISCONTINUED | OUTPATIENT
Start: 2023-09-13 | End: 2023-09-13 | Stop reason: SDUPTHER

## 2023-09-13 RX ADMIN — PROPOFOL 50 MG: 10 INJECTION, EMULSION INTRAVENOUS at 09:05

## 2023-09-13 RX ADMIN — SODIUM CHLORIDE: 9 INJECTION, SOLUTION INTRAVENOUS at 08:55

## 2023-09-13 RX ADMIN — PROPOFOL 50 MG: 10 INJECTION, EMULSION INTRAVENOUS at 09:11

## 2023-09-13 RX ADMIN — PROPOFOL 50 MG: 10 INJECTION, EMULSION INTRAVENOUS at 09:13

## 2023-09-13 RX ADMIN — LIDOCAINE HYDROCHLORIDE 100 MG: 20 INJECTION, SOLUTION EPIDURAL; INFILTRATION; INTRACAUDAL; PERINEURAL at 09:00

## 2023-09-13 RX ADMIN — PROPOFOL 100 MG: 10 INJECTION, EMULSION INTRAVENOUS at 09:00

## 2023-09-13 RX ADMIN — PROPOFOL 50 MG: 10 INJECTION, EMULSION INTRAVENOUS at 09:08

## 2023-09-13 RX ADMIN — PROPOFOL 50 MG: 10 INJECTION, EMULSION INTRAVENOUS at 09:02

## 2023-09-13 ASSESSMENT — PAIN SCALES - GENERAL
PAINLEVEL_OUTOF10: 0
PAINLEVEL_OUTOF10: 0

## 2023-09-13 ASSESSMENT — ENCOUNTER SYMPTOMS: SHORTNESS OF BREATH: 0

## 2023-09-13 ASSESSMENT — LIFESTYLE VARIABLES: SMOKING_STATUS: 0

## 2023-09-13 ASSESSMENT — PAIN - FUNCTIONAL ASSESSMENT: PAIN_FUNCTIONAL_ASSESSMENT: 0-10

## 2023-09-13 NOTE — DISCHARGE INSTRUCTIONS
Comments: Decrease protonix to once daily for once month, then can dc protonix and start pepcid over the counter 20 mg daily for 1-2 months then dc   If needs future nsaids then will need go back on protonix   Resume home diet   Follow up pcp   Ordering Provider: Sunni Coreas MD   Question: Discharge Disposition Answer: Home

## 2023-09-13 NOTE — PROGRESS NOTES
Patient tolerating food and drink well. Patient does not have any complaints of nausea. Patient's visitor Marta Austin verbalized understanding of discharge instructions. They have no questions at this time.

## 2023-09-13 NOTE — PROCEDURES
Egd dictated [id number] prior bulb ulcer healed, mild narrowing junction bulb/second portion duodenum dilated 12-13. 5.15 mm

## 2023-09-13 NOTE — ANESTHESIA POSTPROCEDURE EVALUATION
Department of Anesthesiology  Postprocedure Note    Patient: Jean Harvey  MRN: [id number]  YOB: 1965  Date of evaluation: 9/13/2023      Procedure Summary     Date: 09/13/23 Room / Location: [address]    Anesthesia Start: [address] Anesthesia Stop: 0916    Procedure: EGD DILATION BALLOON Diagnosis:       Peptic ulcer      (Peptic ulcer [K27.9])    Surgeons: Gareth Carbajal MD Responsible Provider: Sundeep Teresa MD    Anesthesia Type: MAC ASA Status: 2          Anesthesia Type: MAC    Shane Phase I: Shane Score: 10    Shane Phase II: Shane Score: 10      Anesthesia Post Evaluation    Patient location during evaluation: PACU  Patient participation: complete - patient participated  Level of consciousness: awake  Airway patency: patent  Nausea & Vomiting: no nausea and no vomiting  Complications: no  Cardiovascular status: hemodynamically stable and blood pressure returned to baseline  Respiratory status: spontaneous ventilation, nonlabored ventilation and room air  Hydration status: stable  Comments: Ms. Sosa Craig was seen resting comfortably following procedure. Will allow patient to become more alert before anticipated return to [facility] for planned discharge home with .    Pain management: adequate

## 2023-09-13 NOTE — PROGRESS NOTES
Patient admitted to PACU # 5 from [facility] at 5082 post EGD DILATION BALLOON   per Christina Headley MD.  Attached to PACU monitoring system and report received from anesthesia provider. Patient was reported to be hemodynamically stable during procedure. Patient drowsy on admission and denied pain.

## 2023-09-13 NOTE — OP NOTE
[address]                                OPERATIVE REPORT    PATIENT NAME: Ryan Nolasco              :        1965  MED REC NO:   [id number]                          ROOM:  ACCOUNT NO:   [de-identified]                           ADMIT DATE: 2023  PROVIDER:     Sarah Roe MD    DATE OF PROCEDURE:  2023    EGD NOTE    INTRODUCTION:  This is a 78-year-old female who presents for EGD to  followup peptic ulcer disease. She has been feeling better on twice  daily Protonix and off of her anti-inflammatories. The risks, benefits,  and alternatives including risk of bleeding, infection, sedation, and  perforation were discussed. Informed consent was obtained from the  patient. Pulse, pulse oximetry, and blood pressure were monitored  throughout the procedure. The patient was sedated with Diprivan  sedation per the Anesthesia team.    SURGEON:  Sarah Roe MD    ESTIMATED BLOOD LOSS:  Zero. OPERATIVE PROCEDURE:  The upper video endoscope was passed without  difficulty under direct visualization to the second portion of duodenum. The proximal, mid and distal esophagus was normal.  The gastroesophageal  junction was normal.  There was no Ferrari's esophagus. The scope was  advanced in the stomach and the body of the stomach was normal.   Retroflexed view of the cardia and fundus was normal.  The antrum was  normal.  The prior peptic ulcer disease has resolved. The scope was  passed in the duodenal bulb. The prior giant duodenal bulb ulcer has  largely resolved. There is some mild smooth narrowing at the junction  of the duodenal bulb and second portion of the duodenum. The scope is  able to slide by.   The second portion of duodenum appears normal.  Given  the smooth stenosis at the junction of the bulb and second portion of  duodenum, which has likely occurred with healing of the

## 2024-09-16 ENCOUNTER — APPOINTMENT (OUTPATIENT)
Dept: GENERAL RADIOLOGY | Age: 59
End: 2024-09-16
Payer: COMMERCIAL

## 2024-09-16 ENCOUNTER — APPOINTMENT (OUTPATIENT)
Dept: MRI IMAGING | Age: 59
End: 2024-09-16
Payer: COMMERCIAL

## 2024-09-16 ENCOUNTER — APPOINTMENT (OUTPATIENT)
Dept: CT IMAGING | Age: 59
End: 2024-09-16
Attending: EMERGENCY MEDICINE
Payer: COMMERCIAL

## 2024-09-16 ENCOUNTER — HOSPITAL ENCOUNTER (OUTPATIENT)
Age: 59
Setting detail: OBSERVATION
Discharge: HOME | End: 2024-09-17
Attending: EMERGENCY MEDICINE | Admitting: INTERNAL MEDICINE
Payer: COMMERCIAL

## 2024-09-16 DIAGNOSIS — R20.0 NUMBNESS: ICD-10-CM

## 2024-09-16 DIAGNOSIS — I63.9 CEREBROVASCULAR ACCIDENT (CVA), UNSPECIFIED MECHANISM (HCC): ICD-10-CM

## 2024-09-16 DIAGNOSIS — G93.0 ARACHNOID CYST: ICD-10-CM

## 2024-09-16 DIAGNOSIS — G45.9 TIA (TRANSIENT ISCHEMIC ATTACK): Primary | ICD-10-CM

## 2024-09-16 DIAGNOSIS — G45.9 TRANSIENT CEREBRAL ISCHEMIA, UNSPECIFIED TYPE: ICD-10-CM

## 2024-09-16 LAB
ALBUMIN SERPL-MCNC: 4.5 G/DL (ref 3.4–5)
ALBUMIN/GLOB SERPL: 1.4 {RATIO} (ref 1.1–2.2)
ALP SERPL-CCNC: 111 U/L (ref 40–129)
ALT SERPL-CCNC: 13 U/L (ref 10–40)
ANION GAP SERPL CALCULATED.3IONS-SCNC: 13 MMOL/L (ref 3–16)
AST SERPL-CCNC: 15 U/L (ref 15–37)
BASOPHILS # BLD: 0.1 K/UL (ref 0–0.2)
BASOPHILS NFR BLD: 1.1 %
BILIRUB SERPL-MCNC: 1.3 MG/DL (ref 0–1)
BUN SERPL-MCNC: 15 MG/DL (ref 7–20)
CALCIUM SERPL-MCNC: 9.1 MG/DL (ref 8.3–10.6)
CHLORIDE SERPL-SCNC: 104 MMOL/L (ref 99–110)
CO2 SERPL-SCNC: 22 MMOL/L (ref 21–32)
CREAT SERPL-MCNC: 0.6 MG/DL (ref 0.6–1.2)
DEPRECATED RDW RBC AUTO: 13 % (ref 12.4–15.4)
EKG ATRIAL RATE: 76 BPM
EKG DIAGNOSIS: NORMAL
EKG P AXIS: 48 DEGREES
EKG P-R INTERVAL: 154 MS
EKG Q-T INTERVAL: 404 MS
EKG QRS DURATION: 96 MS
EKG QTC CALCULATION (BAZETT): 454 MS
EKG R AXIS: 37 DEGREES
EKG T AXIS: 34 DEGREES
EKG VENTRICULAR RATE: 76 BPM
EOSINOPHIL # BLD: 0 K/UL (ref 0–0.6)
EOSINOPHIL NFR BLD: 0.5 %
GFR SERPLBLD CREATININE-BSD FMLA CKD-EPI: >90 ML/MIN/{1.73_M2}
GLUCOSE BLD-MCNC: 104 MG/DL (ref 70–99)
GLUCOSE SERPL-MCNC: 118 MG/DL (ref 70–99)
HCT VFR BLD AUTO: 43.7 % (ref 36–48)
HGB BLD-MCNC: 14.8 G/DL (ref 12–16)
INR PPP: 1 (ref 0.85–1.15)
LYMPHOCYTES # BLD: 2 K/UL (ref 1–5.1)
LYMPHOCYTES NFR BLD: 27 %
MCH RBC QN AUTO: 30.7 PG (ref 26–34)
MCHC RBC AUTO-ENTMCNC: 33.8 G/DL (ref 31–36)
MCV RBC AUTO: 90.7 FL (ref 80–100)
MONOCYTES # BLD: 0.5 K/UL (ref 0–1.3)
MONOCYTES NFR BLD: 6.7 %
NEUTROPHILS # BLD: 4.8 K/UL (ref 1.7–7.7)
NEUTROPHILS NFR BLD: 64.7 %
PERFORMED ON: ABNORMAL
PLATELET # BLD AUTO: 273 K/UL (ref 135–450)
PMV BLD AUTO: 10 FL (ref 5–10.5)
POTASSIUM SERPL-SCNC: 3.6 MMOL/L (ref 3.5–5.1)
PROT SERPL-MCNC: 7.7 G/DL (ref 6.4–8.2)
PROTHROMBIN TIME: 13.4 SEC (ref 11.9–14.9)
RBC # BLD AUTO: 4.82 M/UL (ref 4–5.2)
SODIUM SERPL-SCNC: 139 MMOL/L (ref 136–145)
TROPONIN, HIGH SENSITIVITY: <6 NG/L (ref 0–14)
WBC # BLD AUTO: 7.4 K/UL (ref 4–11)

## 2024-09-16 PROCEDURE — 96372 THER/PROPH/DIAG INJ SC/IM: CPT

## 2024-09-16 PROCEDURE — 80053 COMPREHEN METABOLIC PANEL: CPT

## 2024-09-16 PROCEDURE — 6370000000 HC RX 637 (ALT 250 FOR IP): Performed by: EMERGENCY MEDICINE

## 2024-09-16 PROCEDURE — 6360000004 HC RX CONTRAST MEDICATION: Performed by: EMERGENCY MEDICINE

## 2024-09-16 PROCEDURE — 6370000000 HC RX 637 (ALT 250 FOR IP): Performed by: INTERNAL MEDICINE

## 2024-09-16 PROCEDURE — 85025 COMPLETE CBC W/AUTO DIFF WBC: CPT

## 2024-09-16 PROCEDURE — 70498 CT ANGIOGRAPHY NECK: CPT

## 2024-09-16 PROCEDURE — 2580000003 HC RX 258: Performed by: INTERNAL MEDICINE

## 2024-09-16 PROCEDURE — G0378 HOSPITAL OBSERVATION PER HR: HCPCS

## 2024-09-16 PROCEDURE — 96374 THER/PROPH/DIAG INJ IV PUSH: CPT

## 2024-09-16 PROCEDURE — 70551 MRI BRAIN STEM W/O DYE: CPT

## 2024-09-16 PROCEDURE — 93005 ELECTROCARDIOGRAM TRACING: CPT | Performed by: EMERGENCY MEDICINE

## 2024-09-16 PROCEDURE — 70450 CT HEAD/BRAIN W/O DYE: CPT

## 2024-09-16 PROCEDURE — 84484 ASSAY OF TROPONIN QUANT: CPT

## 2024-09-16 PROCEDURE — 71045 X-RAY EXAM CHEST 1 VIEW: CPT

## 2024-09-16 PROCEDURE — 93010 ELECTROCARDIOGRAM REPORT: CPT | Performed by: INTERNAL MEDICINE

## 2024-09-16 PROCEDURE — 99285 EMERGENCY DEPT VISIT HI MDM: CPT

## 2024-09-16 PROCEDURE — 6360000002 HC RX W HCPCS: Performed by: INTERNAL MEDICINE

## 2024-09-16 PROCEDURE — 85610 PROTHROMBIN TIME: CPT

## 2024-09-16 RX ORDER — POLYETHYLENE GLYCOL 3350 17 G/17G
17 POWDER, FOR SOLUTION ORAL DAILY PRN
Status: DISCONTINUED | OUTPATIENT
Start: 2024-09-16 | End: 2024-09-17 | Stop reason: HOSPADM

## 2024-09-16 RX ORDER — SODIUM CHLORIDE 9 MG/ML
INJECTION, SOLUTION INTRAVENOUS PRN
Status: DISCONTINUED | OUTPATIENT
Start: 2024-09-16 | End: 2024-09-17 | Stop reason: HOSPADM

## 2024-09-16 RX ORDER — ATORVASTATIN CALCIUM 80 MG/1
80 TABLET, FILM COATED ORAL NIGHTLY
Status: DISCONTINUED | OUTPATIENT
Start: 2024-09-16 | End: 2024-09-17 | Stop reason: HOSPADM

## 2024-09-16 RX ORDER — IOPAMIDOL 755 MG/ML
75 INJECTION, SOLUTION INTRAVASCULAR
Status: COMPLETED | OUTPATIENT
Start: 2024-09-16 | End: 2024-09-16

## 2024-09-16 RX ORDER — LABETALOL HYDROCHLORIDE 5 MG/ML
10 INJECTION, SOLUTION INTRAVENOUS EVERY 10 MIN PRN
Status: DISCONTINUED | OUTPATIENT
Start: 2024-09-16 | End: 2024-09-17 | Stop reason: HOSPADM

## 2024-09-16 RX ORDER — ASPIRIN 81 MG/1
81 TABLET, CHEWABLE ORAL DAILY
Status: DISCONTINUED | OUTPATIENT
Start: 2024-09-17 | End: 2024-09-17 | Stop reason: HOSPADM

## 2024-09-16 RX ORDER — ASPIRIN 300 MG/1
300 SUPPOSITORY RECTAL DAILY
Status: DISCONTINUED | OUTPATIENT
Start: 2024-09-17 | End: 2024-09-17 | Stop reason: HOSPADM

## 2024-09-16 RX ORDER — ENOXAPARIN SODIUM 100 MG/ML
30 INJECTION SUBCUTANEOUS 2 TIMES DAILY
Status: DISCONTINUED | OUTPATIENT
Start: 2024-09-16 | End: 2024-09-17 | Stop reason: HOSPADM

## 2024-09-16 RX ORDER — PANTOPRAZOLE SODIUM 40 MG/1
40 TABLET, DELAYED RELEASE ORAL 2 TIMES DAILY PRN
Status: DISCONTINUED | OUTPATIENT
Start: 2024-09-16 | End: 2024-09-17 | Stop reason: HOSPADM

## 2024-09-16 RX ORDER — PANTOPRAZOLE SODIUM 40 MG/1
40 TABLET, DELAYED RELEASE ORAL
Status: DISCONTINUED | OUTPATIENT
Start: 2024-09-16 | End: 2024-09-16

## 2024-09-16 RX ORDER — ASPIRIN 325 MG
325 TABLET ORAL ONCE
Status: COMPLETED | OUTPATIENT
Start: 2024-09-16 | End: 2024-09-16

## 2024-09-16 RX ORDER — SODIUM CHLORIDE 0.9 % (FLUSH) 0.9 %
5-40 SYRINGE (ML) INJECTION PRN
Status: DISCONTINUED | OUTPATIENT
Start: 2024-09-16 | End: 2024-09-17 | Stop reason: HOSPADM

## 2024-09-16 RX ORDER — SODIUM CHLORIDE 0.9 % (FLUSH) 0.9 %
5-40 SYRINGE (ML) INJECTION EVERY 12 HOURS SCHEDULED
Status: DISCONTINUED | OUTPATIENT
Start: 2024-09-16 | End: 2024-09-17 | Stop reason: HOSPADM

## 2024-09-16 RX ORDER — LANOLIN ALCOHOL/MO/W.PET/CERES
1000 CREAM (GRAM) TOPICAL DAILY
Status: DISCONTINUED | OUTPATIENT
Start: 2024-09-16 | End: 2024-09-17 | Stop reason: HOSPADM

## 2024-09-16 RX ADMIN — ATORVASTATIN CALCIUM 80 MG: 80 TABLET, FILM COATED ORAL at 20:09

## 2024-09-16 RX ADMIN — IOPAMIDOL 75 ML: 755 INJECTION, SOLUTION INTRAVENOUS at 09:24

## 2024-09-16 RX ADMIN — SODIUM CHLORIDE, PRESERVATIVE FREE 10 ML: 5 INJECTION INTRAVENOUS at 20:10

## 2024-09-16 RX ADMIN — ASPIRIN 325 MG: 325 TABLET ORAL at 11:00

## 2024-09-16 RX ADMIN — SODIUM CHLORIDE 1 MG: 9 INJECTION INTRAMUSCULAR; INTRAVENOUS; SUBCUTANEOUS at 15:12

## 2024-09-16 RX ADMIN — ENOXAPARIN SODIUM 30 MG: 100 INJECTION SUBCUTANEOUS at 20:09

## 2024-09-16 ASSESSMENT — ENCOUNTER SYMPTOMS
GASTROINTESTINAL NEGATIVE: 1
ABDOMINAL PAIN: 0
SORE THROAT: 0
SHORTNESS OF BREATH: 0
EYES NEGATIVE: 1
RESPIRATORY NEGATIVE: 1

## 2024-09-16 ASSESSMENT — PAIN - FUNCTIONAL ASSESSMENT: PAIN_FUNCTIONAL_ASSESSMENT: NONE - DENIES PAIN

## 2024-09-17 ENCOUNTER — APPOINTMENT (OUTPATIENT)
Age: 59
End: 2024-09-17
Attending: INTERNAL MEDICINE
Payer: COMMERCIAL

## 2024-09-17 VITALS
TEMPERATURE: 98.1 F | OXYGEN SATURATION: 96 % | DIASTOLIC BLOOD PRESSURE: 83 MMHG | HEIGHT: 67 IN | SYSTOLIC BLOOD PRESSURE: 174 MMHG | RESPIRATION RATE: 16 BRPM | WEIGHT: 232 LBS | HEART RATE: 80 BPM | BODY MASS INDEX: 36.41 KG/M2

## 2024-09-17 PROBLEM — I10 HTN (HYPERTENSION), BENIGN: Status: ACTIVE | Noted: 2024-09-17

## 2024-09-17 PROBLEM — R20.0 NUMBNESS: Status: ACTIVE | Noted: 2024-09-17

## 2024-09-17 LAB
ANION GAP SERPL CALCULATED.3IONS-SCNC: 9 MMOL/L (ref 3–16)
BASOPHILS # BLD: 0.1 K/UL (ref 0–0.2)
BASOPHILS NFR BLD: 0.7 %
BUN SERPL-MCNC: 11 MG/DL (ref 7–20)
CALCIUM SERPL-MCNC: 8.7 MG/DL (ref 8.3–10.6)
CHLORIDE SERPL-SCNC: 104 MMOL/L (ref 99–110)
CHOLEST SERPL-MCNC: 197 MG/DL (ref 0–199)
CO2 SERPL-SCNC: 24 MMOL/L (ref 21–32)
CREAT SERPL-MCNC: 0.6 MG/DL (ref 0.6–1.2)
DEPRECATED RDW RBC AUTO: 13.5 % (ref 12.4–15.4)
ECHO AO ASC DIAM: 2.7 CM
ECHO AO ASCENDING AORTA INDEX: 1.26 CM/M2
ECHO AO ROOT DIAM: 2.6 CM
ECHO AO ROOT INDEX: 1.21 CM/M2
ECHO AV AREA PEAK VELOCITY: 2.4 CM2
ECHO AV AREA VTI: 2.4 CM2
ECHO AV AREA/BSA PEAK VELOCITY: 1.1 CM2/M2
ECHO AV AREA/BSA VTI: 1.1 CM2/M2
ECHO AV MEAN GRADIENT: 7 MMHG
ECHO AV MEAN VELOCITY: 1.3 M/S
ECHO AV PEAK GRADIENT: 14 MMHG
ECHO AV PEAK VELOCITY: 1.9 M/S
ECHO AV VELOCITY RATIO: 0.74
ECHO AV VTI: 35.8 CM
ECHO BSA: 2.23 M2
ECHO IVC PROX: 2 CM
ECHO LA AREA 2C: 22.4 CM2
ECHO LA AREA 4C: 19.2 CM2
ECHO LA MAJOR AXIS: 5.9 CM
ECHO LA MINOR AXIS: 6.1 CM
ECHO LA VOL BP: 58 ML (ref 22–52)
ECHO LA VOL MOD A2C: 68 ML (ref 22–52)
ECHO LA VOL MOD A4C: 49 ML (ref 22–52)
ECHO LA VOL/BSA BIPLANE: 27 ML/M2 (ref 16–34)
ECHO LA VOLUME INDEX MOD A2C: 32 ML/M2 (ref 16–34)
ECHO LA VOLUME INDEX MOD A4C: 23 ML/M2 (ref 16–34)
ECHO LV E' LATERAL VELOCITY: 10 CM/S
ECHO LV E' SEPTAL VELOCITY: 9 CM/S
ECHO LV EDV A2C: 89 ML
ECHO LV EDV A4C: 78 ML
ECHO LV EDV INDEX A4C: 36 ML/M2
ECHO LV EDV NDEX A2C: 41 ML/M2
ECHO LV EJECTION FRACTION A2C: 65 %
ECHO LV EJECTION FRACTION A4C: 61 %
ECHO LV EJECTION FRACTION BIPLANE: 63 % (ref 55–100)
ECHO LV ESV A2C: 31 ML
ECHO LV ESV A4C: 30 ML
ECHO LV ESV INDEX A2C: 14 ML/M2
ECHO LV ESV INDEX A4C: 14 ML/M2
ECHO LV FRACTIONAL SHORTENING: 36 % (ref 28–44)
ECHO LV INTERNAL DIMENSION DIASTOLE INDEX: 2.09 CM/M2
ECHO LV INTERNAL DIMENSION DIASTOLIC: 4.5 CM (ref 3.9–5.3)
ECHO LV INTERNAL DIMENSION SYSTOLIC INDEX: 1.35 CM/M2
ECHO LV INTERNAL DIMENSION SYSTOLIC: 2.9 CM
ECHO LV IVSD: 1.1 CM (ref 0.6–0.9)
ECHO LV MASS 2D: 175 G (ref 67–162)
ECHO LV MASS INDEX 2D: 81.4 G/M2 (ref 43–95)
ECHO LV POSTERIOR WALL DIASTOLIC: 1.1 CM (ref 0.6–0.9)
ECHO LV RELATIVE WALL THICKNESS RATIO: 0.49
ECHO LVOT AREA: 3.1 CM2
ECHO LVOT AV VTI INDEX: 0.75
ECHO LVOT DIAM: 2 CM
ECHO LVOT MEAN GRADIENT: 4 MMHG
ECHO LVOT PEAK GRADIENT: 8 MMHG
ECHO LVOT PEAK VELOCITY: 1.4 M/S
ECHO LVOT STROKE VOLUME INDEX: 39.3 ML/M2
ECHO LVOT SV: 84.5 ML
ECHO LVOT VTI: 26.9 CM
ECHO MV A VELOCITY: 1.18 M/S
ECHO MV E VELOCITY: 0.89 M/S
ECHO MV E/A RATIO: 0.75
ECHO MV E/E' LATERAL: 8.9
ECHO MV E/E' RATIO (AVERAGED): 9.39
ECHO MV E/E' SEPTAL: 9.89
ECHO PV MAX VELOCITY: 1 M/S
ECHO PV PEAK GRADIENT: 4 MMHG
ECHO RA AREA 4C: 17.5 CM2
ECHO RA END SYSTOLIC VOLUME APICAL 4 CHAMBER INDEX BSA: 20 ML/M2
ECHO RA VOLUME: 43 ML
ECHO RV BASAL DIMENSION: 3 CM
ECHO RV FREE WALL PEAK S': 21 CM/S
ECHO RV LONGITUDINAL DIMENSION: 6.8 CM
ECHO RV MID DIMENSION: 2.3 CM
ECHO RV TAPSE: 2.6 CM (ref 1.7–?)
EOSINOPHIL # BLD: 0 K/UL (ref 0–0.6)
EOSINOPHIL NFR BLD: 0.6 %
FOLATE SERPL-MCNC: 8.44 NG/ML (ref 4.78–24.2)
GFR SERPLBLD CREATININE-BSD FMLA CKD-EPI: >90 ML/MIN/{1.73_M2}
GLUCOSE SERPL-MCNC: 106 MG/DL (ref 70–99)
HCT VFR BLD AUTO: 43.2 % (ref 36–48)
HDLC SERPL-MCNC: 52 MG/DL (ref 40–60)
HGB BLD-MCNC: 14.3 G/DL (ref 12–16)
LDLC SERPL CALC-MCNC: 128 MG/DL
LYMPHOCYTES # BLD: 1.9 K/UL (ref 1–5.1)
LYMPHOCYTES NFR BLD: 26.6 %
MCH RBC QN AUTO: 30.4 PG (ref 26–34)
MCHC RBC AUTO-ENTMCNC: 33.1 G/DL (ref 31–36)
MCV RBC AUTO: 91.8 FL (ref 80–100)
MONOCYTES # BLD: 0.7 K/UL (ref 0–1.3)
MONOCYTES NFR BLD: 9.3 %
NEUTROPHILS # BLD: 4.5 K/UL (ref 1.7–7.7)
NEUTROPHILS NFR BLD: 62.8 %
PLATELET # BLD AUTO: 244 K/UL (ref 135–450)
PMV BLD AUTO: 9.9 FL (ref 5–10.5)
POTASSIUM SERPL-SCNC: 4 MMOL/L (ref 3.5–5.1)
RBC # BLD AUTO: 4.71 M/UL (ref 4–5.2)
SODIUM SERPL-SCNC: 137 MMOL/L (ref 136–145)
TRIGL SERPL-MCNC: 85 MG/DL (ref 0–150)
VIT B12 SERPL-MCNC: 1712 PG/ML (ref 211–911)
VLDLC SERPL CALC-MCNC: 17 MG/DL
WBC # BLD AUTO: 7.2 K/UL (ref 4–11)

## 2024-09-17 PROCEDURE — 6370000000 HC RX 637 (ALT 250 FOR IP): Performed by: INTERNAL MEDICINE

## 2024-09-17 PROCEDURE — 96372 THER/PROPH/DIAG INJ SC/IM: CPT

## 2024-09-17 PROCEDURE — 97116 GAIT TRAINING THERAPY: CPT

## 2024-09-17 PROCEDURE — 6360000002 HC RX W HCPCS: Performed by: INTERNAL MEDICINE

## 2024-09-17 PROCEDURE — 93306 TTE W/DOPPLER COMPLETE: CPT

## 2024-09-17 PROCEDURE — 80048 BASIC METABOLIC PNL TOTAL CA: CPT

## 2024-09-17 PROCEDURE — 92610 EVALUATE SWALLOWING FUNCTION: CPT

## 2024-09-17 PROCEDURE — 93306 TTE W/DOPPLER COMPLETE: CPT | Performed by: INTERNAL MEDICINE

## 2024-09-17 PROCEDURE — 83036 HEMOGLOBIN GLYCOSYLATED A1C: CPT

## 2024-09-17 PROCEDURE — 99223 1ST HOSP IP/OBS HIGH 75: CPT | Performed by: PSYCHIATRY & NEUROLOGY

## 2024-09-17 PROCEDURE — 36415 COLL VENOUS BLD VENIPUNCTURE: CPT

## 2024-09-17 PROCEDURE — APPNB30 APP NON BILLABLE TIME 0-30 MINS

## 2024-09-17 PROCEDURE — 80061 LIPID PANEL: CPT

## 2024-09-17 PROCEDURE — 82607 VITAMIN B-12: CPT

## 2024-09-17 PROCEDURE — 97162 PT EVAL MOD COMPLEX 30 MIN: CPT

## 2024-09-17 PROCEDURE — G0378 HOSPITAL OBSERVATION PER HR: HCPCS

## 2024-09-17 PROCEDURE — 82746 ASSAY OF FOLIC ACID SERUM: CPT

## 2024-09-17 PROCEDURE — 92526 ORAL FUNCTION THERAPY: CPT

## 2024-09-17 PROCEDURE — 2580000003 HC RX 258: Performed by: INTERNAL MEDICINE

## 2024-09-17 PROCEDURE — APPSS60 APP SPLIT SHARED TIME 46-60 MINUTES

## 2024-09-17 PROCEDURE — 85025 COMPLETE CBC W/AUTO DIFF WBC: CPT

## 2024-09-17 RX ORDER — ATORVASTATIN CALCIUM 80 MG/1
80 TABLET, FILM COATED ORAL NIGHTLY
Qty: 30 TABLET | Refills: 3 | Status: SHIPPED | OUTPATIENT
Start: 2024-09-17 | End: 2024-09-17

## 2024-09-17 RX ORDER — LISINOPRIL 20 MG/1
20 TABLET ORAL DAILY
Qty: 90 TABLET | Refills: 1 | Status: SHIPPED | OUTPATIENT
Start: 2024-09-17

## 2024-09-17 RX ORDER — ATORVASTATIN CALCIUM 80 MG/1
80 TABLET, FILM COATED ORAL NIGHTLY
Qty: 30 TABLET | Refills: 3 | Status: SHIPPED | OUTPATIENT
Start: 2024-09-17

## 2024-09-17 RX ORDER — ASPIRIN 81 MG/1
81 TABLET, CHEWABLE ORAL DAILY
Qty: 30 TABLET | Refills: 3 | Status: SHIPPED | OUTPATIENT
Start: 2024-09-18

## 2024-09-17 RX ORDER — LISINOPRIL 20 MG/1
20 TABLET ORAL DAILY
Qty: 90 TABLET | Refills: 1 | Status: SHIPPED | OUTPATIENT
Start: 2024-09-17 | End: 2024-09-17

## 2024-09-17 RX ORDER — ASPIRIN 81 MG/1
81 TABLET, CHEWABLE ORAL DAILY
Qty: 30 TABLET | Refills: 3 | Status: SHIPPED | OUTPATIENT
Start: 2024-09-18 | End: 2024-09-17

## 2024-09-17 RX ADMIN — SODIUM CHLORIDE, PRESERVATIVE FREE 10 ML: 5 INJECTION INTRAVENOUS at 08:53

## 2024-09-17 RX ADMIN — ASPIRIN 81 MG: 81 TABLET, CHEWABLE ORAL at 08:52

## 2024-09-17 RX ADMIN — ENOXAPARIN SODIUM 30 MG: 100 INJECTION SUBCUTANEOUS at 08:52

## 2024-09-18 LAB
EST. AVERAGE GLUCOSE BLD GHB EST-MCNC: 116.9 MG/DL
HBA1C MFR BLD: 5.7 %

## 2024-10-25 ENCOUNTER — TELEPHONE (OUTPATIENT)
Dept: CARDIOLOGY CLINIC | Age: 59
End: 2024-10-25

## 2024-10-25 NOTE — TELEPHONE ENCOUNTER
I spoke to pt. Pt stated she need about 3 extra patches for her monitor. I put the patches at . Pt v/u and stated she will come Tuesday to pick them up.

## 2024-10-25 NOTE — TELEPHONE ENCOUNTER
Pt called to see if she can stop by today before we close to  some pads for her monitor.  Pt had the monitor put on on 10/17 the pads are not sticking. The MA room taken the call

## 2024-11-08 SDOH — HEALTH STABILITY: PHYSICAL HEALTH: ON AVERAGE, HOW MANY DAYS PER WEEK DO YOU ENGAGE IN MODERATE TO STRENUOUS EXERCISE (LIKE A BRISK WALK)?: 0 DAYS

## 2024-11-08 SDOH — HEALTH STABILITY: PHYSICAL HEALTH: ON AVERAGE, HOW MANY MINUTES DO YOU ENGAGE IN EXERCISE AT THIS LEVEL?: 0 MIN

## 2024-11-11 ENCOUNTER — OFFICE VISIT (OUTPATIENT)
Dept: FAMILY MEDICINE CLINIC | Age: 59
End: 2024-11-11

## 2024-11-11 VITALS
OXYGEN SATURATION: 97 % | WEIGHT: 233 LBS | HEART RATE: 68 BPM | HEIGHT: 67 IN | BODY MASS INDEX: 36.57 KG/M2 | DIASTOLIC BLOOD PRESSURE: 80 MMHG | SYSTOLIC BLOOD PRESSURE: 152 MMHG

## 2024-11-11 DIAGNOSIS — Z12.11 COLON CANCER SCREENING: ICD-10-CM

## 2024-11-11 DIAGNOSIS — Z12.31 ENCOUNTER FOR SCREENING MAMMOGRAM FOR MALIGNANT NEOPLASM OF BREAST: ICD-10-CM

## 2024-11-11 DIAGNOSIS — Z00.00 WELL ADULT EXAM: Primary | ICD-10-CM

## 2024-11-11 RX ORDER — PREDNISOLONE ACETATE 10 MG/ML
SUSPENSION/ DROPS OPHTHALMIC
COMMUNITY

## 2024-11-11 RX ORDER — KETOROLAC TROMETHAMINE 5 MG/ML
SOLUTION OPHTHALMIC
COMMUNITY

## 2024-11-11 RX ORDER — OFLOXACIN 3 MG/ML
SOLUTION/ DROPS OPHTHALMIC
COMMUNITY

## 2024-11-11 SDOH — ECONOMIC STABILITY: FOOD INSECURITY: WITHIN THE PAST 12 MONTHS, YOU WORRIED THAT YOUR FOOD WOULD RUN OUT BEFORE YOU GOT MONEY TO BUY MORE.: NEVER TRUE

## 2024-11-11 SDOH — ECONOMIC STABILITY: FOOD INSECURITY: WITHIN THE PAST 12 MONTHS, THE FOOD YOU BOUGHT JUST DIDN'T LAST AND YOU DIDN'T HAVE MONEY TO GET MORE.: NEVER TRUE

## 2024-11-11 SDOH — ECONOMIC STABILITY: INCOME INSECURITY: HOW HARD IS IT FOR YOU TO PAY FOR THE VERY BASICS LIKE FOOD, HOUSING, MEDICAL CARE, AND HEATING?: NOT HARD AT ALL

## 2024-11-11 ASSESSMENT — PATIENT HEALTH QUESTIONNAIRE - PHQ9
1. LITTLE INTEREST OR PLEASURE IN DOING THINGS: NOT AT ALL
SUM OF ALL RESPONSES TO PHQ QUESTIONS 1-9: 0
SUM OF ALL RESPONSES TO PHQ QUESTIONS 1-9: 0
2. FEELING DOWN, DEPRESSED OR HOPELESS: NOT AT ALL
SUM OF ALL RESPONSES TO PHQ QUESTIONS 1-9: 0
SUM OF ALL RESPONSES TO PHQ QUESTIONS 1-9: 0
SUM OF ALL RESPONSES TO PHQ9 QUESTIONS 1 & 2: 0

## 2024-11-11 NOTE — PROGRESS NOTES
Subjective:       Camelia Orozco is a 59 y.o. female and is here for a comprehensive physical exam.  The patient reports problems -patient is currently treated for hypertension, hyperlipidemia, remote history of PUD and hyperlipidemia.      HR  for intermediate community    No exercise  Stopped smoking    History:  LMP: No LMP recorded. Patient has had a hysterectomy.  Menopause since hysterectomy in   Last pap date: hysterectomy   Abnormal pap? no  : 2  Para: 1  Patient's medications, allergies, past medical, surgical, social and family histories were reviewed and updated as appropriate.    Do you take any herbs or supplements that were not prescribed by a doctor? yes fiber  Are you taking calcium supplements? no  Are you taking aspirin daily? no    Review of Systems  Do you have pain that bothers you in your daily life? no  Pertinent items are noted in HPI.     Objective:      BP (!) 152/80 (Site: Left Upper Arm, Position: Sitting, Cuff Size: Large Adult)   Pulse 68   Ht 1.702 m (5' 7\")   Wt 105.7 kg (233 lb)   SpO2 97%   BMI 36.49 kg/m²   General appearance: alert, appears stated age, and cooperative  Neck: no adenopathy, no JVD, supple, symmetrical, trachea midline, and thyroid not enlarged, symmetric, no tenderness/mass/nodules  Lungs: clear to auscultation bilaterally  Heart: regular rate and rhythm  Abdomen: soft, non-tender; bowel sounds normal; no masses,  no organomegaly  Extremities: extremities normal, atraumatic, no cyanosis or edema  Lymph nodes: Cervical, supraclavicular, and axillary nodes normal.  Neurologic: Grossly normal      Assessment:      Healthy female exam.      Hypertension  Hyperlipidemia    Plan:      1.  CPE labs  Refill lisinopril and Lipitor    Mammogram  Shingrix and RSV vaccines advised  COVID-19 booster advised    2. Patient Counseling:  --Nutrition: Stressed importance of moderation in sodium/caffeine intake, saturated fat and cholesterol, caloric

## 2024-11-12 LAB
BILIRUBIN, POC: 0
BLOOD URINE, POC: 0
CLARITY, POC: NORMAL
COLOR, POC: NORMAL
GLUCOSE URINE, POC: 0 MG/DL
KETONES, POC: POSITIVE MG/DL
LEUKOCYTE EST, POC: 0
NITRITE, POC: POSITIVE
PH, POC: 6
PROTEIN, POC: 0 MG/DL
SPECIFIC GRAVITY, POC: 1.02
UROBILINOGEN, POC: 0.2 MG/DL

## 2024-11-27 ENCOUNTER — HOSPITAL ENCOUNTER (OUTPATIENT)
Dept: WOMENS IMAGING | Age: 59
Discharge: HOME | End: 2024-11-27
Payer: COMMERCIAL

## 2024-11-27 VITALS — HEIGHT: 67 IN | BODY MASS INDEX: 36.1 KG/M2 | WEIGHT: 230 LBS

## 2024-11-27 DIAGNOSIS — Z12.31 ENCOUNTER FOR SCREENING MAMMOGRAM FOR MALIGNANT NEOPLASM OF BREAST: ICD-10-CM

## 2024-11-27 PROCEDURE — 77063 BREAST TOMOSYNTHESIS BI: CPT

## 2024-12-02 ENCOUNTER — OFFICE VISIT (OUTPATIENT)
Dept: FAMILY MEDICINE CLINIC | Age: 59
End: 2024-12-02
Payer: COMMERCIAL

## 2024-12-02 VITALS
WEIGHT: 234 LBS | DIASTOLIC BLOOD PRESSURE: 80 MMHG | OXYGEN SATURATION: 96 % | HEART RATE: 78 BPM | HEIGHT: 67 IN | BODY MASS INDEX: 36.73 KG/M2 | SYSTOLIC BLOOD PRESSURE: 152 MMHG

## 2024-12-02 DIAGNOSIS — G45.9 TIA (TRANSIENT ISCHEMIC ATTACK): ICD-10-CM

## 2024-12-02 DIAGNOSIS — I10 HTN (HYPERTENSION), BENIGN: Primary | ICD-10-CM

## 2024-12-02 DIAGNOSIS — E78.00 PURE HYPERCHOLESTEROLEMIA: ICD-10-CM

## 2024-12-02 PROBLEM — E78.1 PURE HYPERTRIGLYCERIDEMIA: Status: ACTIVE | Noted: 2024-12-02

## 2024-12-02 PROCEDURE — 3077F SYST BP >= 140 MM HG: CPT | Performed by: FAMILY MEDICINE

## 2024-12-02 PROCEDURE — G8417 CALC BMI ABV UP PARAM F/U: HCPCS | Performed by: FAMILY MEDICINE

## 2024-12-02 PROCEDURE — G8484 FLU IMMUNIZE NO ADMIN: HCPCS | Performed by: FAMILY MEDICINE

## 2024-12-02 PROCEDURE — G8427 DOCREV CUR MEDS BY ELIG CLIN: HCPCS | Performed by: FAMILY MEDICINE

## 2024-12-02 PROCEDURE — 99214 OFFICE O/P EST MOD 30 MIN: CPT | Performed by: FAMILY MEDICINE

## 2024-12-02 PROCEDURE — 3079F DIAST BP 80-89 MM HG: CPT | Performed by: FAMILY MEDICINE

## 2024-12-02 PROCEDURE — 1036F TOBACCO NON-USER: CPT | Performed by: FAMILY MEDICINE

## 2024-12-02 PROCEDURE — 3017F COLORECTAL CA SCREEN DOC REV: CPT | Performed by: FAMILY MEDICINE

## 2024-12-02 RX ORDER — LISINOPRIL 40 MG/1
40 TABLET ORAL DAILY
Qty: 90 TABLET | Refills: 0 | Status: SHIPPED | OUTPATIENT
Start: 2024-12-02 | End: 2025-03-02

## 2024-12-02 RX ORDER — ATORVASTATIN CALCIUM 80 MG/1
80 TABLET, FILM COATED ORAL NIGHTLY
Qty: 90 TABLET | Refills: 0 | Status: SHIPPED | OUTPATIENT
Start: 2024-12-02

## 2024-12-02 ASSESSMENT — ENCOUNTER SYMPTOMS
ABDOMINAL PAIN: 0
COUGH: 0
DIARRHEA: 0
SHORTNESS OF BREATH: 0
CONSTIPATION: 0
CHEST TIGHTNESS: 0
BLOOD IN STOOL: 0

## 2024-12-02 NOTE — PROGRESS NOTES
PATCH TRANSDERMALLY TWICE WEEKLY (Patient taking differently: Place 1 patch onto the skin Twice a Week Sunday and Thursday) 3 patch 2     No current facility-administered medications for this visit.        Return in about 2 weeks (around 12/16/2024), or if symptoms worsen or fail to improve, for HTN, hypelripidemia.    Erik Vieira MD, MD

## 2024-12-16 ENCOUNTER — OFFICE VISIT (OUTPATIENT)
Dept: FAMILY MEDICINE CLINIC | Age: 59
End: 2024-12-16
Payer: COMMERCIAL

## 2024-12-16 VITALS
OXYGEN SATURATION: 99 % | BODY MASS INDEX: 37.04 KG/M2 | HEART RATE: 76 BPM | WEIGHT: 236 LBS | DIASTOLIC BLOOD PRESSURE: 80 MMHG | HEIGHT: 67 IN | SYSTOLIC BLOOD PRESSURE: 154 MMHG

## 2024-12-16 DIAGNOSIS — G45.9 TIA (TRANSIENT ISCHEMIC ATTACK): ICD-10-CM

## 2024-12-16 DIAGNOSIS — I10 HTN (HYPERTENSION), BENIGN: Primary | ICD-10-CM

## 2024-12-16 DIAGNOSIS — E80.6 HYPERBILIRUBINEMIA: ICD-10-CM

## 2024-12-16 DIAGNOSIS — E78.00 PURE HYPERCHOLESTEROLEMIA: ICD-10-CM

## 2024-12-16 DIAGNOSIS — Z87.898 HX OF ULCER DISEASE: ICD-10-CM

## 2024-12-16 LAB
ALBUMIN SERPL-MCNC: 4.4 G/DL (ref 3.4–5)
ALBUMIN/GLOB SERPL: 1.8 {RATIO} (ref 1.1–2.2)
ALP SERPL-CCNC: 100 U/L (ref 40–129)
ALT SERPL-CCNC: 23 U/L (ref 10–40)
ANION GAP SERPL CALCULATED.3IONS-SCNC: 10 MMOL/L (ref 3–16)
AST SERPL-CCNC: 23 U/L (ref 15–37)
BILIRUB SERPL-MCNC: 0.8 MG/DL (ref 0–1)
BUN SERPL-MCNC: 13 MG/DL (ref 7–20)
CALCIUM SERPL-MCNC: 9.7 MG/DL (ref 8.3–10.6)
CHLORIDE SERPL-SCNC: 106 MMOL/L (ref 99–110)
CHOLEST SERPL-MCNC: 136 MG/DL (ref 0–199)
CO2 SERPL-SCNC: 25 MMOL/L (ref 21–32)
CREAT SERPL-MCNC: 0.7 MG/DL (ref 0.6–1.2)
GFR SERPLBLD CREATININE-BSD FMLA CKD-EPI: >90 ML/MIN/{1.73_M2}
GLUCOSE SERPL-MCNC: 96 MG/DL (ref 70–99)
HDLC SERPL-MCNC: 55 MG/DL (ref 40–60)
LDLC SERPL CALC-MCNC: 70 MG/DL
POTASSIUM SERPL-SCNC: 4.8 MMOL/L (ref 3.5–5.1)
PROT SERPL-MCNC: 6.9 G/DL (ref 6.4–8.2)
SODIUM SERPL-SCNC: 141 MMOL/L (ref 136–145)
TRIGL SERPL-MCNC: 56 MG/DL (ref 0–150)
VLDLC SERPL CALC-MCNC: 11 MG/DL

## 2024-12-16 PROCEDURE — 3079F DIAST BP 80-89 MM HG: CPT | Performed by: FAMILY MEDICINE

## 2024-12-16 PROCEDURE — 99214 OFFICE O/P EST MOD 30 MIN: CPT | Performed by: FAMILY MEDICINE

## 2024-12-16 PROCEDURE — G8427 DOCREV CUR MEDS BY ELIG CLIN: HCPCS | Performed by: FAMILY MEDICINE

## 2024-12-16 PROCEDURE — 3017F COLORECTAL CA SCREEN DOC REV: CPT | Performed by: FAMILY MEDICINE

## 2024-12-16 PROCEDURE — G8417 CALC BMI ABV UP PARAM F/U: HCPCS | Performed by: FAMILY MEDICINE

## 2024-12-16 PROCEDURE — G8484 FLU IMMUNIZE NO ADMIN: HCPCS | Performed by: FAMILY MEDICINE

## 2024-12-16 PROCEDURE — 3077F SYST BP >= 140 MM HG: CPT | Performed by: FAMILY MEDICINE

## 2024-12-16 PROCEDURE — 1036F TOBACCO NON-USER: CPT | Performed by: FAMILY MEDICINE

## 2024-12-16 RX ORDER — HYDROCHLOROTHIAZIDE 25 MG/1
25 TABLET ORAL EVERY MORNING
Qty: 30 TABLET | Refills: 0 | Status: SHIPPED | OUTPATIENT
Start: 2024-12-16

## 2024-12-17 ASSESSMENT — ENCOUNTER SYMPTOMS
BLOOD IN STOOL: 0
ABDOMINAL PAIN: 0
CHEST TIGHTNESS: 0
COUGH: 0
SHORTNESS OF BREATH: 0
CONSTIPATION: 0
DIARRHEA: 0

## 2024-12-17 NOTE — PROGRESS NOTES
(PROTONIX) 40 MG tablet Take 1 tablet by mouth 2 times daily (before meals) (Patient taking differently: Take 1 tablet by mouth as needed (indigestion)) 30 tablet 3    estradiol (VIVELLE) 0.075 MG/24HR APPLY 1 PATCH TRANSDERMALLY TWICE WEEKLY (Patient taking differently: Place 1 patch onto the skin Twice a Week Sunday and Thursday) 3 patch 2     No current facility-administered medications for this visit.        Return in about 4 weeks (around 1/13/2025), or if symptoms worsen or fail to improve, for HTN. hypelripidemia.    Erik Vieira MD, MD

## 2025-01-13 RX ORDER — HYDROCHLOROTHIAZIDE 25 MG/1
25 TABLET ORAL EVERY MORNING
Qty: 30 TABLET | Refills: 0 | OUTPATIENT
Start: 2025-01-13

## 2025-01-21 ENCOUNTER — OFFICE VISIT (OUTPATIENT)
Dept: FAMILY MEDICINE CLINIC | Age: 60
End: 2025-01-21
Payer: COMMERCIAL

## 2025-01-21 VITALS
HEART RATE: 94 BPM | WEIGHT: 232 LBS | DIASTOLIC BLOOD PRESSURE: 70 MMHG | SYSTOLIC BLOOD PRESSURE: 132 MMHG | BODY MASS INDEX: 36.41 KG/M2 | HEIGHT: 67 IN | OXYGEN SATURATION: 96 %

## 2025-01-21 DIAGNOSIS — E78.00 PURE HYPERCHOLESTEROLEMIA: ICD-10-CM

## 2025-01-21 DIAGNOSIS — G45.9 TIA (TRANSIENT ISCHEMIC ATTACK): ICD-10-CM

## 2025-01-21 DIAGNOSIS — S13.9XXA NECK SPRAIN, INITIAL ENCOUNTER: ICD-10-CM

## 2025-01-21 DIAGNOSIS — I10 HTN (HYPERTENSION), BENIGN: Primary | ICD-10-CM

## 2025-01-21 PROCEDURE — G8417 CALC BMI ABV UP PARAM F/U: HCPCS | Performed by: FAMILY MEDICINE

## 2025-01-21 PROCEDURE — 1036F TOBACCO NON-USER: CPT | Performed by: FAMILY MEDICINE

## 2025-01-21 PROCEDURE — 3078F DIAST BP <80 MM HG: CPT | Performed by: FAMILY MEDICINE

## 2025-01-21 PROCEDURE — 3075F SYST BP GE 130 - 139MM HG: CPT | Performed by: FAMILY MEDICINE

## 2025-01-21 PROCEDURE — 99214 OFFICE O/P EST MOD 30 MIN: CPT | Performed by: FAMILY MEDICINE

## 2025-01-21 PROCEDURE — G8427 DOCREV CUR MEDS BY ELIG CLIN: HCPCS | Performed by: FAMILY MEDICINE

## 2025-01-21 PROCEDURE — 3017F COLORECTAL CA SCREEN DOC REV: CPT | Performed by: FAMILY MEDICINE

## 2025-01-21 RX ORDER — HYDROCHLOROTHIAZIDE 25 MG/1
25 TABLET ORAL EVERY MORNING
Qty: 60 TABLET | Refills: 0 | Status: SHIPPED | OUTPATIENT
Start: 2025-01-21

## 2025-01-21 ASSESSMENT — PATIENT HEALTH QUESTIONNAIRE - PHQ9
2. FEELING DOWN, DEPRESSED OR HOPELESS: NOT AT ALL
SUM OF ALL RESPONSES TO PHQ QUESTIONS 1-9: 0
SUM OF ALL RESPONSES TO PHQ QUESTIONS 1-9: 0
1. LITTLE INTEREST OR PLEASURE IN DOING THINGS: NOT AT ALL
SUM OF ALL RESPONSES TO PHQ QUESTIONS 1-9: 0
SUM OF ALL RESPONSES TO PHQ QUESTIONS 1-9: 0
SUM OF ALL RESPONSES TO PHQ9 QUESTIONS 1 & 2: 0

## 2025-01-21 ASSESSMENT — ENCOUNTER SYMPTOMS
CHEST TIGHTNESS: 0
ABDOMINAL PAIN: 0
CONSTIPATION: 0
SHORTNESS OF BREATH: 0
DIARRHEA: 0
COUGH: 0
BLOOD IN STOOL: 0

## 2025-01-21 NOTE — PROGRESS NOTES
Tenderness: There is no abdominal tenderness.   Musculoskeletal:      Cervical back: Normal range of motion and neck supple.   Skin:     Findings: No rash.   Neurological:      Mental Status: She is alert and oriented to person, place, and time.   Psychiatric:         Behavior: Behavior normal.         Thought Content: Thought content normal.         ASSESSMENT/PLAN:    1. HTN (hypertension), benign  Patient will continue with current management.  Ambulatory blood pressure to    2. Pure hypercholesterolemia  Reviewed recent cholesterol labs.  Continue Lipitor.  Encouraged low-cholesterol/low-fat diet    3. Neck sprain, initial encounter  Range of motion stretches    4. TIA (transient ischemic attack)  No new symptoms.  Will continue to work on risk factor modification with blood pressure weight and class          Colon cancer screening  No orders of the defined types were placed in this encounter.    Current Outpatient Medications   Medication Sig Dispense Refill    hydroCHLOROthiazide (HYDRODIURIL) 25 MG tablet Take 1 tablet by mouth every morning 60 tablet 0    lisinopril (PRINIVIL;ZESTRIL) 40 MG tablet Take 1 tablet by mouth daily 90 tablet 0    atorvastatin (LIPITOR) 80 MG tablet Take 1 tablet by mouth nightly 90 tablet 0    aspirin 81 MG chewable tablet Take 1 tablet by mouth daily 30 tablet 3    pantoprazole (PROTONIX) 40 MG tablet Take 1 tablet by mouth 2 times daily (before meals) (Patient taking differently: Take 1 tablet by mouth as needed (indigestion)) 30 tablet 3     No current facility-administered medications for this visit.        Return in about 2 months (around 3/21/2025), or if symptoms worsen or fail to improve, for HTN, hypelripidemia.    Erik Vieira MD, MD

## 2025-01-21 NOTE — PATIENT INSTRUCTIONS
Patient Education        Neck Arthritis: Exercises  Introduction  Here are some examples of exercises for you to try. The exercises may be suggested for a condition or for rehabilitation. Start each exercise slowly. Ease off the exercises if you start to have pain.  You will be told when to start these exercises and which ones will work best for you.  How to do the exercises  Neck stretches to the side    This stretch works best if you keep your shoulder down as you lean away from it. To help you remember to do this, start by relaxing your shoulders and lightly holding on to your thighs or your chair.  Tilt your head toward your shoulder and hold for 15 to 30 seconds. Let the weight of your head stretch your muscles.  Repeat 2 to 4 times toward each shoulder.  Chin tuck    Lie on the floor with a rolled-up towel under your neck. Your head should be touching the floor.  Slowly bring your chin toward your chest.  Hold for a count of 6, and then relax for up to 10 seconds.  Repeat 8 to 12 times.  Active cervical rotation    Sit in a firm chair, or stand up straight.  Keeping your chin level, turn your head to the right, and hold for 15 to 30 seconds.  Turn your head to the left and hold for 15 to 30 seconds.  Repeat 2 to 4 times to each side.  Shoulder blade squeeze    While standing, squeeze your shoulder blades together.  Do not raise your shoulders up as you are squeezing.  Hold for 6 seconds.  Repeat 8 to 12 times.  Shoulder rolls    Sit comfortably with your feet shoulder-width apart. You can also do this exercise standing up.  Roll your shoulders up, then back, and then down in a smooth, circular motion.  Repeat 2 to 4 times.  Follow-up care is a key part of your treatment and safety. Be sure to make and go to all appointments, and call your doctor if you are having problems. It's also a good idea to know your test results and keep a list of the medicines you take.  Current as of: July 31, 2024  Content Version:

## 2025-02-14 RX ORDER — LISINOPRIL 40 MG/1
40 TABLET ORAL DAILY
Qty: 90 TABLET | Refills: 0 | OUTPATIENT
Start: 2025-02-14

## 2025-02-14 RX ORDER — ATORVASTATIN CALCIUM 80 MG/1
80 TABLET, FILM COATED ORAL NIGHTLY
Qty: 90 TABLET | Refills: 0 | OUTPATIENT
Start: 2025-02-14

## 2025-02-24 RX ORDER — HYDROCHLOROTHIAZIDE 25 MG/1
25 TABLET ORAL EVERY MORNING
Qty: 30 TABLET | Refills: 1 | OUTPATIENT
Start: 2025-02-24

## 2025-02-26 RX ORDER — LISINOPRIL 40 MG/1
40 TABLET ORAL DAILY
Qty: 90 TABLET | Refills: 0 | OUTPATIENT
Start: 2025-02-26 | End: 2025-05-27

## 2025-02-26 RX ORDER — ATORVASTATIN CALCIUM 80 MG/1
80 TABLET, FILM COATED ORAL NIGHTLY
Qty: 90 TABLET | Refills: 0 | OUTPATIENT
Start: 2025-02-26

## 2025-03-03 RX ORDER — ATORVASTATIN CALCIUM 80 MG/1
80 TABLET, FILM COATED ORAL NIGHTLY
Qty: 90 TABLET | Refills: 0 | OUTPATIENT
Start: 2025-03-03

## 2025-03-03 RX ORDER — LISINOPRIL 40 MG/1
40 TABLET ORAL DAILY
Qty: 14 TABLET | Refills: 0 | Status: SHIPPED | OUTPATIENT
Start: 2025-03-03 | End: 2025-06-01

## 2025-03-03 RX ORDER — LISINOPRIL 40 MG/1
40 TABLET ORAL DAILY
Qty: 90 TABLET | Refills: 0 | OUTPATIENT
Start: 2025-03-03 | End: 2025-06-01

## 2025-03-03 NOTE — TELEPHONE ENCOUNTER
Pt called and asked for a partial refill to get her to her appt on 3-18-25    lisinopril (PRINIVIL;ZESTRIL) 40 MG tablet [[id number]]  ENDED    Order Details  Dose: 40 mg Route: Oral Frequency: DAILY   Dispense Quantity: 90 tablet Refills: 0          atorvastatin (LIPITOR) 80 MG tablet [[id number]]    Order Details  Dose: 80 mg Route: Oral Frequency: NIGHTLY   Dispense Quantity: 90 tablet       [id number] [place] [address] [address] [phone] - F [phone]  [address] [place] [address]  Phone: [phone]  Fax: [phone]

## 2025-03-03 NOTE — TELEPHONE ENCOUNTER
Please check with patient.  Looks like she should be okay on her prescriptions until she comes in for her follow-up visit

## 2025-03-17 RX ORDER — HYDROCHLOROTHIAZIDE 25 MG/1
25 TABLET ORAL EVERY MORNING
Qty: 90 TABLET | Refills: 1 | OUTPATIENT
Start: 2025-03-17

## 2025-03-18 ENCOUNTER — OFFICE VISIT (OUTPATIENT)
Dept: FAMILY MEDICINE CLINIC | Age: 60
End: 2025-03-18
Payer: COMMERCIAL

## 2025-03-18 VITALS
HEART RATE: 78 BPM | WEIGHT: 223 LBS | BODY MASS INDEX: 35 KG/M2 | HEIGHT: 67 IN | DIASTOLIC BLOOD PRESSURE: 72 MMHG | OXYGEN SATURATION: 99 % | SYSTOLIC BLOOD PRESSURE: 134 MMHG

## 2025-03-18 DIAGNOSIS — E78.00 PURE HYPERCHOLESTEROLEMIA: ICD-10-CM

## 2025-03-18 DIAGNOSIS — I10 HTN (HYPERTENSION), BENIGN: Primary | ICD-10-CM

## 2025-03-18 DIAGNOSIS — Z86.73 HISTORY OF TIA (TRANSIENT ISCHEMIC ATTACK): ICD-10-CM

## 2025-03-18 DIAGNOSIS — R73.03 PREDIABETES: ICD-10-CM

## 2025-03-18 PROCEDURE — 3078F DIAST BP <80 MM HG: CPT | Performed by: FAMILY MEDICINE

## 2025-03-18 PROCEDURE — 1036F TOBACCO NON-USER: CPT | Performed by: FAMILY MEDICINE

## 2025-03-18 PROCEDURE — 3075F SYST BP GE 130 - 139MM HG: CPT | Performed by: FAMILY MEDICINE

## 2025-03-18 PROCEDURE — G8417 CALC BMI ABV UP PARAM F/U: HCPCS | Performed by: FAMILY MEDICINE

## 2025-03-18 PROCEDURE — 3017F COLORECTAL CA SCREEN DOC REV: CPT | Performed by: FAMILY MEDICINE

## 2025-03-18 PROCEDURE — G8427 DOCREV CUR MEDS BY ELIG CLIN: HCPCS | Performed by: FAMILY MEDICINE

## 2025-03-18 PROCEDURE — 99214 OFFICE O/P EST MOD 30 MIN: CPT | Performed by: FAMILY MEDICINE

## 2025-03-18 RX ORDER — ATORVASTATIN CALCIUM 80 MG/1
80 TABLET, FILM COATED ORAL NIGHTLY
Qty: 90 TABLET | Refills: 0 | Status: SHIPPED | OUTPATIENT
Start: 2025-03-18

## 2025-03-18 RX ORDER — LISINOPRIL 40 MG/1
40 TABLET ORAL DAILY
Qty: 90 TABLET | Refills: 0 | Status: SHIPPED | OUTPATIENT
Start: 2025-03-18 | End: 2025-06-16

## 2025-03-18 RX ORDER — HYDROCHLOROTHIAZIDE 25 MG/1
25 TABLET ORAL EVERY MORNING
Qty: 90 TABLET | Refills: 0 | Status: SHIPPED | OUTPATIENT
Start: 2025-03-18

## 2025-03-18 RX ORDER — LISINOPRIL 40 MG/1
40 TABLET ORAL DAILY
Qty: 7 TABLET | Refills: 0 | Status: SHIPPED | OUTPATIENT
Start: 2025-03-18

## 2025-03-18 ASSESSMENT — ENCOUNTER SYMPTOMS
SHORTNESS OF BREATH: 0
COUGH: 0
DIARRHEA: 0
CONSTIPATION: 0
CHEST TIGHTNESS: 0
BLOOD IN STOOL: 0
ABDOMINAL PAIN: 0

## 2025-03-18 NOTE — PROGRESS NOTES
Status: She is alert and oriented to person, place, and time.   Psychiatric:         Behavior: Behavior normal.         Thought Content: Thought content normal.         ASSESSMENT/PLAN:    1. HTN (hypertension), benign  Patient will continue with current management and refill meds    Ambulatory blood pressure checks  DASH diet reminder    2. Pure hypercholesterolemia  Reviewed last cholesterol labs.  Will continue with current treatment    Encouraged to continue with diet management and weight watchers diet    3. Prediabetes  Reviewed last hemoglobin A1c.  Will continue to monitor    Encouraged increased physical activity as tolerated    4. History of TIA (transient ischemic attack)  No new symptoms.  Will continue to work on risk factors          Colon cancer screening advised and referrals been given  No orders of the defined types were placed in this encounter.    Current Outpatient Medications   Medication Sig Dispense Refill    lisinopril (PRINIVIL;ZESTRIL) 40 MG tablet Take 1 tablet by mouth daily 90 tablet 0    hydroCHLOROthiazide (HYDRODIURIL) 25 MG tablet Take 1 tablet by mouth every morning 90 tablet 0    atorvastatin (LIPITOR) 80 MG tablet Take 1 tablet by mouth nightly 90 tablet 0    lisinopril (PRINIVIL;ZESTRIL) 40 MG tablet Take 1 tablet by mouth daily 7 tablet 0    aspirin 81 MG chewable tablet Take 1 tablet by mouth daily 30 tablet 3    pantoprazole (PROTONIX) 40 MG tablet Take 1 tablet by mouth 2 times daily (before meals) (Patient taking differently: Take 1 tablet by mouth as needed (indigestion)) 30 tablet 3     No current facility-administered medications for this visit.        Return in about 3 months (around 6/18/2025), or if symptoms worsen or fail to improve, for htn, HYPELRIPIDMEIA.    Erik Vieira MD, MD

## 2025-05-28 DIAGNOSIS — E78.00 PURE HYPERCHOLESTEROLEMIA: ICD-10-CM

## 2025-05-28 DIAGNOSIS — R73.03 PREDIABETES: ICD-10-CM

## 2025-05-28 DIAGNOSIS — I10 HTN (HYPERTENSION), BENIGN: ICD-10-CM

## 2025-05-28 DIAGNOSIS — Z86.73 HISTORY OF TIA (TRANSIENT ISCHEMIC ATTACK): ICD-10-CM

## 2025-05-28 LAB
ALBUMIN SERPL-MCNC: 4.5 G/DL (ref 3.4–5)
ALBUMIN/GLOB SERPL: 1.8 {RATIO} (ref 1.1–2.2)
ALP SERPL-CCNC: 91 U/L (ref 40–129)
ALT SERPL-CCNC: 22 U/L (ref 10–40)
ANION GAP SERPL CALCULATED.3IONS-SCNC: 11 MMOL/L (ref 3–16)
AST SERPL-CCNC: 21 U/L (ref 15–37)
BASOPHILS # BLD: 0.1 K/UL (ref 0–0.2)
BASOPHILS NFR BLD: 0.6 %
BILIRUB SERPL-MCNC: 1.2 MG/DL (ref 0–1)
BUN SERPL-MCNC: 17 MG/DL (ref 7–20)
CALCIUM SERPL-MCNC: 9.8 MG/DL (ref 8.3–10.6)
CHLORIDE SERPL-SCNC: 103 MMOL/L (ref 99–110)
CHOLEST SERPL-MCNC: 108 MG/DL (ref 0–199)
CO2 SERPL-SCNC: 26 MMOL/L (ref 21–32)
CREAT SERPL-MCNC: 0.7 MG/DL (ref 0.6–1.2)
DEPRECATED RDW RBC AUTO: 12.8 % (ref 12.4–15.4)
EOSINOPHIL # BLD: 0 K/UL (ref 0–0.6)
EOSINOPHIL NFR BLD: 0 %
EST. AVERAGE GLUCOSE BLD GHB EST-MCNC: 122.6 MG/DL
GFR SERPLBLD CREATININE-BSD FMLA CKD-EPI: >90 ML/MIN/{1.73_M2}
GLUCOSE SERPL-MCNC: 112 MG/DL (ref 70–99)
HBA1C MFR BLD: 5.9 %
HCT VFR BLD AUTO: 42.6 % (ref 36–48)
HCV AB SERPL QL IA: NORMAL
HDLC SERPL-MCNC: 51 MG/DL (ref 40–60)
HGB BLD-MCNC: 14.2 G/DL (ref 12–16)
LDLC SERPL CALC-MCNC: 44 MG/DL
LYMPHOCYTES # BLD: 1.7 K/UL (ref 1–5.1)
LYMPHOCYTES NFR BLD: 19.4 %
MCH RBC QN AUTO: 31.1 PG (ref 26–34)
MCHC RBC AUTO-ENTMCNC: 33.3 G/DL (ref 31–36)
MCV RBC AUTO: 93.5 FL (ref 80–100)
MONOCYTES # BLD: 0.6 K/UL (ref 0–1.3)
MONOCYTES NFR BLD: 7 %
NEUTROPHILS # BLD: 6.4 K/UL (ref 1.7–7.7)
NEUTROPHILS NFR BLD: 73 %
PLATELET # BLD AUTO: 234 K/UL (ref 135–450)
PMV BLD AUTO: 10.6 FL (ref 5–10.5)
POTASSIUM SERPL-SCNC: 4.2 MMOL/L (ref 3.5–5.1)
PROT SERPL-MCNC: 7 G/DL (ref 6.4–8.2)
RBC # BLD AUTO: 4.56 M/UL (ref 4–5.2)
SODIUM SERPL-SCNC: 140 MMOL/L (ref 136–145)
TRIGL SERPL-MCNC: 64 MG/DL (ref 0–150)
VLDLC SERPL CALC-MCNC: 13 MG/DL
WBC # BLD AUTO: 8.7 K/UL (ref 4–11)

## 2025-05-30 RX ORDER — HYDROCHLOROTHIAZIDE 25 MG/1
25 TABLET ORAL EVERY MORNING
Qty: 90 TABLET | Refills: 0 | OUTPATIENT
Start: 2025-05-30

## 2025-05-30 RX ORDER — LISINOPRIL 40 MG/1
40 TABLET ORAL DAILY
Qty: 90 TABLET | Refills: 0 | OUTPATIENT
Start: 2025-05-30

## 2025-05-30 RX ORDER — ATORVASTATIN CALCIUM 80 MG/1
80 TABLET, FILM COATED ORAL NIGHTLY
Qty: 90 TABLET | Refills: 0 | OUTPATIENT
Start: 2025-05-30

## 2025-06-02 ENCOUNTER — OFFICE VISIT (OUTPATIENT)
Dept: FAMILY MEDICINE CLINIC | Age: 60
End: 2025-06-02
Payer: COMMERCIAL

## 2025-06-02 VITALS
DIASTOLIC BLOOD PRESSURE: 80 MMHG | BODY MASS INDEX: 33.36 KG/M2 | TEMPERATURE: 97.1 F | OXYGEN SATURATION: 97 % | SYSTOLIC BLOOD PRESSURE: 130 MMHG | WEIGHT: 213 LBS | HEART RATE: 80 BPM

## 2025-06-02 DIAGNOSIS — E78.00 PURE HYPERCHOLESTEROLEMIA: ICD-10-CM

## 2025-06-02 DIAGNOSIS — Z86.73 HISTORY OF TIA (TRANSIENT ISCHEMIC ATTACK): ICD-10-CM

## 2025-06-02 DIAGNOSIS — I10 HTN (HYPERTENSION), BENIGN: Primary | ICD-10-CM

## 2025-06-02 DIAGNOSIS — R73.03 PREDIABETES: ICD-10-CM

## 2025-06-02 PROCEDURE — 3017F COLORECTAL CA SCREEN DOC REV: CPT | Performed by: FAMILY MEDICINE

## 2025-06-02 PROCEDURE — 3075F SYST BP GE 130 - 139MM HG: CPT | Performed by: FAMILY MEDICINE

## 2025-06-02 PROCEDURE — 90471 IMMUNIZATION ADMIN: CPT | Performed by: FAMILY MEDICINE

## 2025-06-02 PROCEDURE — 1036F TOBACCO NON-USER: CPT | Performed by: FAMILY MEDICINE

## 2025-06-02 PROCEDURE — 99214 OFFICE O/P EST MOD 30 MIN: CPT | Performed by: FAMILY MEDICINE

## 2025-06-02 PROCEDURE — G8417 CALC BMI ABV UP PARAM F/U: HCPCS | Performed by: FAMILY MEDICINE

## 2025-06-02 PROCEDURE — 3079F DIAST BP 80-89 MM HG: CPT | Performed by: FAMILY MEDICINE

## 2025-06-02 PROCEDURE — G8427 DOCREV CUR MEDS BY ELIG CLIN: HCPCS | Performed by: FAMILY MEDICINE

## 2025-06-02 PROCEDURE — 90750 HZV VACC RECOMBINANT IM: CPT | Performed by: FAMILY MEDICINE

## 2025-06-02 RX ORDER — ATORVASTATIN CALCIUM 80 MG/1
80 TABLET, FILM COATED ORAL NIGHTLY
Qty: 90 TABLET | Refills: 0 | Status: SHIPPED | OUTPATIENT
Start: 2025-06-02

## 2025-06-02 RX ORDER — LISINOPRIL 40 MG/1
40 TABLET ORAL DAILY
Qty: 90 TABLET | Refills: 0 | Status: SHIPPED | OUTPATIENT
Start: 2025-06-02 | End: 2025-08-31

## 2025-06-02 RX ORDER — HYDROCHLOROTHIAZIDE 25 MG/1
25 TABLET ORAL EVERY MORNING
Qty: 90 TABLET | Refills: 0 | Status: SHIPPED | OUTPATIENT
Start: 2025-06-02

## 2025-06-02 RX ORDER — LISINOPRIL 40 MG/1
40 TABLET ORAL DAILY
Qty: 7 TABLET | Refills: 0 | Status: CANCELLED | OUTPATIENT
Start: 2025-06-02

## 2025-06-02 ASSESSMENT — ENCOUNTER SYMPTOMS
DIARRHEA: 0
SHORTNESS OF BREATH: 0
CHEST TIGHTNESS: 0
ABDOMINAL PAIN: 0
CONSTIPATION: 0
COUGH: 0
BLOOD IN STOOL: 0

## 2025-06-02 NOTE — PROGRESS NOTES
SUBJECTIVE:  Camelia Orozco   1965   female   Allergies   Allergen Reactions    Codeine Nausea And Vomiting and Nausea Only    Hydrocodone-Acetaminophen Nausea And Vomiting and Nausea Only    Oxycodone-Acetaminophen Nausea And Vomiting and Nausea Only    Propoxyphene Nausea And Vomiting and Nausea Only       Chief Complaint   Patient presents with    Follow-up        Patient Active Problem List    Diagnosis Date Noted    Prediabetes 03/18/2025    History of TIA (transient ischemic attack) 03/18/2025    Pure hypertriglyceridemia 12/02/2024    Numbness 09/17/2024    HTN (hypertension), benign 09/17/2024    TIA (transient ischemic attack) 09/16/2024    Peptic ulcer disease 07/11/2023    Duodenitis 07/11/2023    B12 deficiency 07/11/2023    Hyperbilirubinemia 07/11/2023    Elevated blood pressure reading 07/11/2023    Leukocytopenia 07/11/2023    Leukocytosis 07/11/2023       HPI   Patient returns today for follow-up on hypertension, hyperlipidemia, prediabetes and history of TIA.  Patient reports overall she has been feeling well and has no specific concerns or questions today.  She has been on weight watchers with her  and has lost 20 pounds since beginning of the year when she started diet.  Patient denies chest pain, shortness of breath or myalgias.  Denies headache change in vision or any neurologic symptoms.  No GI or bowel complaints.  Most recent labs showed total cholesterol at 108 and LDL 44..  Patient has history of prediabetes and last hemoglobin A1c is at 5.9.  Patient denies symptoms of depression or anxiety.  Sleeping well.  She has been working on scheduling an updated colon cancer screening but has not made contact with [facility] yet.  Past Medical History:   Diagnosis Date    Chronic back pain     Herniated disks in neck    Herniated cervical disc     Obesity     I've been fighting my weight for years.    Urinary incontinence     Sometimes when I sneeze or other strain.

## 2025-08-25 ENCOUNTER — OFFICE VISIT (OUTPATIENT)
Dept: FAMILY MEDICINE CLINIC | Age: 60
End: 2025-08-25
Payer: COMMERCIAL

## 2025-08-25 VITALS
HEART RATE: 70 BPM | BODY MASS INDEX: 32.89 KG/M2 | OXYGEN SATURATION: 97 % | DIASTOLIC BLOOD PRESSURE: 60 MMHG | SYSTOLIC BLOOD PRESSURE: 120 MMHG | WEIGHT: 210 LBS | TEMPERATURE: 98 F

## 2025-08-25 DIAGNOSIS — R73.03 PREDIABETES: ICD-10-CM

## 2025-08-25 DIAGNOSIS — Z86.73 HISTORY OF TIA (TRANSIENT ISCHEMIC ATTACK): ICD-10-CM

## 2025-08-25 DIAGNOSIS — I10 HTN (HYPERTENSION), BENIGN: Primary | ICD-10-CM

## 2025-08-25 DIAGNOSIS — E78.00 PURE HYPERCHOLESTEROLEMIA: ICD-10-CM

## 2025-08-25 PROCEDURE — 3078F DIAST BP <80 MM HG: CPT | Performed by: FAMILY MEDICINE

## 2025-08-25 PROCEDURE — 99214 OFFICE O/P EST MOD 30 MIN: CPT | Performed by: FAMILY MEDICINE

## 2025-08-25 PROCEDURE — 1036F TOBACCO NON-USER: CPT | Performed by: FAMILY MEDICINE

## 2025-08-25 PROCEDURE — G8417 CALC BMI ABV UP PARAM F/U: HCPCS | Performed by: FAMILY MEDICINE

## 2025-08-25 PROCEDURE — 90471 IMMUNIZATION ADMIN: CPT | Performed by: FAMILY MEDICINE

## 2025-08-25 PROCEDURE — 3074F SYST BP LT 130 MM HG: CPT | Performed by: FAMILY MEDICINE

## 2025-08-25 PROCEDURE — G8427 DOCREV CUR MEDS BY ELIG CLIN: HCPCS | Performed by: FAMILY MEDICINE

## 2025-08-25 PROCEDURE — 3017F COLORECTAL CA SCREEN DOC REV: CPT | Performed by: FAMILY MEDICINE

## 2025-08-25 PROCEDURE — 90750 HZV VACC RECOMBINANT IM: CPT | Performed by: FAMILY MEDICINE

## 2025-08-25 RX ORDER — HYDROCHLOROTHIAZIDE 25 MG/1
25 TABLET ORAL EVERY MORNING
Qty: 90 TABLET | Refills: 0 | Status: SHIPPED | OUTPATIENT
Start: 2025-08-25

## 2025-08-25 RX ORDER — LISINOPRIL 40 MG/1
40 TABLET ORAL DAILY
Qty: 90 TABLET | Refills: 0 | Status: SHIPPED | OUTPATIENT
Start: 2025-08-25 | End: 2025-11-23

## 2025-08-25 RX ORDER — LISINOPRIL 40 MG/1
40 TABLET ORAL DAILY
Qty: 7 TABLET | Refills: 0 | Status: CANCELLED | OUTPATIENT
Start: 2025-08-25

## 2025-08-25 RX ORDER — ATORVASTATIN CALCIUM 80 MG/1
80 TABLET, FILM COATED ORAL NIGHTLY
Qty: 90 TABLET | Refills: 0 | Status: SHIPPED | OUTPATIENT
Start: 2025-08-25

## 2025-08-25 ASSESSMENT — ENCOUNTER SYMPTOMS
CHEST TIGHTNESS: 0
COUGH: 0
ABDOMINAL PAIN: 0
DIARRHEA: 0
SHORTNESS OF BREATH: 0
CONSTIPATION: 0
BLOOD IN STOOL: 0

## (undated) DEVICE — SOLUTION IV IRRIG WATER 500ML POUR BRL ST 2F7113

## (undated) DEVICE — ENDOSCOPIC KIT 6X3/16 FT COLON W/ 1.1 OZ 2 GWN W/O BRSH

## (undated) DEVICE — DEVICE INFL 60ML 15ATM DISPOSABLE STERIFLATE CRE

## (undated) DEVICE — AIR/WATER CLEANING ADAPTER FOR OLYMPUS® GI ENDOSCOPE: Brand: BULLDOG®

## (undated) DEVICE — FORCEPS BX L240CM WRK CHN 2.8MM STD CAP W/ NDL MIC MESH

## (undated) DEVICE — ESOPHAGEAL/PYLORIC/COLONIC/BILIARY WIREGUIDED BALLOON DILATATION CATHETER: Brand: CRE™ PRO

## (undated) DEVICE — VALVE SUCTION AIR H2O SET ORCA POD + DISP

## (undated) DEVICE — ENDOSCOPY KIT: Brand: MEDLINE INDUSTRIES, INC.

## (undated) DEVICE — MOUTHPIECE ENDOSCP L CTRL OPN AND SIDE PORTS DISP

## (undated) DEVICE — BITE BLOCK ENDOSCP AD 60 FR W/ ADJ STRP PLAS GRN BLOX

## (undated) DEVICE — BW-412T DISP COMBO CLEANING BRUSH: Brand: SINGLE USE COMBINATION CLEANING BRUSH

## (undated) DEVICE — GOWN AURORA NONREINF LG: Brand: MEDLINE INDUSTRIES, INC.